# Patient Record
Sex: FEMALE | Race: WHITE | NOT HISPANIC OR LATINO | Employment: OTHER | URBAN - METROPOLITAN AREA
[De-identification: names, ages, dates, MRNs, and addresses within clinical notes are randomized per-mention and may not be internally consistent; named-entity substitution may affect disease eponyms.]

---

## 2019-12-26 ENCOUNTER — HOSPITAL ENCOUNTER (EMERGENCY)
Facility: HOSPITAL | Age: 84
Discharge: LEFT BEFORE DISCHARGE | End: 2019-12-27

## 2019-12-26 RX ORDER — SODIUM CHLORIDE 0.9 % (FLUSH) 0.9 %
10 SYRINGE (ML) INJECTION AS NEEDED
Status: DISCONTINUED | OUTPATIENT
Start: 2019-12-26 | End: 2019-12-27 | Stop reason: HOSPADM

## 2019-12-27 VITALS
RESPIRATION RATE: 17 BRPM | SYSTOLIC BLOOD PRESSURE: 156 MMHG | TEMPERATURE: 97.2 F | DIASTOLIC BLOOD PRESSURE: 86 MMHG | HEART RATE: 98 BPM | OXYGEN SATURATION: 95 %

## 2023-01-01 ENCOUNTER — HOSPITAL ENCOUNTER (INPATIENT)
Facility: HOSPITAL | Age: 88
LOS: 6 days | DRG: 871 | End: 2023-05-15
Attending: EMERGENCY MEDICINE | Admitting: HOSPITALIST
Payer: MEDICARE

## 2023-01-01 ENCOUNTER — APPOINTMENT (OUTPATIENT)
Dept: GENERAL RADIOLOGY | Facility: HOSPITAL | Age: 88
DRG: 871 | End: 2023-01-01
Payer: MEDICARE

## 2023-01-01 ENCOUNTER — APPOINTMENT (OUTPATIENT)
Dept: CARDIOLOGY | Facility: HOSPITAL | Age: 88
DRG: 871 | End: 2023-01-01
Payer: MEDICARE

## 2023-01-01 VITALS
OXYGEN SATURATION: 86 % | DIASTOLIC BLOOD PRESSURE: 40 MMHG | BODY MASS INDEX: 21.33 KG/M2 | TEMPERATURE: 97 F | HEIGHT: 65 IN | HEART RATE: 102 BPM | SYSTOLIC BLOOD PRESSURE: 63 MMHG | RESPIRATION RATE: 12 BRPM | WEIGHT: 128 LBS

## 2023-01-01 DIAGNOSIS — N17.9 ACUTE KIDNEY INJURY: ICD-10-CM

## 2023-01-01 DIAGNOSIS — D72.829 LEUKOCYTOSIS, UNSPECIFIED TYPE: ICD-10-CM

## 2023-01-01 DIAGNOSIS — L03.116 CELLULITIS OF LEFT LOWER EXTREMITY: Primary | ICD-10-CM

## 2023-01-01 LAB
ALBUMIN SERPL-MCNC: 2.7 G/DL (ref 3.5–5.2)
ALBUMIN SERPL-MCNC: 3 G/DL (ref 3.5–5.2)
ALBUMIN SERPL-MCNC: 3.3 G/DL (ref 3.5–5.2)
ALBUMIN/GLOB SERPL: 1.3 G/DL
ALP SERPL-CCNC: 68 U/L (ref 39–117)
ALT SERPL W P-5'-P-CCNC: 11 U/L (ref 1–33)
ANION GAP SERPL CALCULATED.3IONS-SCNC: 15.3 MMOL/L (ref 5–15)
ANION GAP SERPL CALCULATED.3IONS-SCNC: 16 MMOL/L (ref 5–15)
ANION GAP SERPL CALCULATED.3IONS-SCNC: 9.3 MMOL/L (ref 5–15)
ANION GAP SERPL CALCULATED.3IONS-SCNC: 9.9 MMOL/L (ref 5–15)
AST SERPL-CCNC: 26 U/L (ref 1–32)
BACTERIA SPEC AEROBE CULT: NO GROWTH
BACTERIA SPEC AEROBE CULT: NORMAL
BACTERIA SPEC AEROBE CULT: NORMAL
BACTERIA UR QL AUTO: ABNORMAL /HPF
BASOPHILS # BLD AUTO: 0.07 10*3/MM3 (ref 0–0.2)
BASOPHILS # BLD AUTO: 0.07 10*3/MM3 (ref 0–0.2)
BASOPHILS # BLD AUTO: 0.09 10*3/MM3 (ref 0–0.2)
BASOPHILS # BLD AUTO: 0.1 10*3/MM3 (ref 0–0.2)
BASOPHILS NFR BLD AUTO: 0.3 % (ref 0–1.5)
BASOPHILS NFR BLD AUTO: 0.3 % (ref 0–1.5)
BASOPHILS NFR BLD AUTO: 0.4 % (ref 0–1.5)
BASOPHILS NFR BLD AUTO: 0.4 % (ref 0–1.5)
BH CV LOWER ARTERIAL LEFT GREAT TOE SYS MAX: 84
BH CV LOWER ARTERIAL LEFT TBI RATIO: 0.66
BH CV LOWER ARTERIAL RIGHT GREAT TOE SYS MAX: 64
BH CV LOWER ARTERIAL RIGHT TBI RATIO: 0.5
BILIRUB SERPL-MCNC: 0.3 MG/DL (ref 0–1.2)
BILIRUB UR QL STRIP: NEGATIVE
BUN SERPL-MCNC: 26 MG/DL (ref 8–23)
BUN SERPL-MCNC: 33 MG/DL (ref 8–23)
BUN SERPL-MCNC: 54 MG/DL (ref 8–23)
BUN SERPL-MCNC: 78 MG/DL (ref 8–23)
BUN/CREAT SERPL: 27.1 (ref 7–25)
BUN/CREAT SERPL: 28.9 (ref 7–25)
BUN/CREAT SERPL: 33.3 (ref 7–25)
BUN/CREAT SERPL: 33.3 (ref 7–25)
CALCIUM SPEC-SCNC: 8.2 MG/DL (ref 8.2–9.6)
CALCIUM SPEC-SCNC: 8.2 MG/DL (ref 8.2–9.6)
CALCIUM SPEC-SCNC: 8.4 MG/DL (ref 8.2–9.6)
CALCIUM SPEC-SCNC: 8.5 MG/DL (ref 8.2–9.6)
CHLORIDE SERPL-SCNC: 103 MMOL/L (ref 98–107)
CHLORIDE SERPL-SCNC: 105 MMOL/L (ref 98–107)
CHLORIDE SERPL-SCNC: 112 MMOL/L (ref 98–107)
CHLORIDE SERPL-SCNC: 115 MMOL/L (ref 98–107)
CHOLEST SERPL-MCNC: 99 MG/DL (ref 0–200)
CK SERPL-CCNC: 58 U/L (ref 20–180)
CLARITY UR: CLEAR
CO2 SERPL-SCNC: 18 MMOL/L (ref 22–29)
CO2 SERPL-SCNC: 19.7 MMOL/L (ref 22–29)
CO2 SERPL-SCNC: 22.7 MMOL/L (ref 22–29)
CO2 SERPL-SCNC: 23.1 MMOL/L (ref 22–29)
COLOR UR: YELLOW
CREAT SERPL-MCNC: 0.96 MG/DL (ref 0.57–1)
CREAT SERPL-MCNC: 1.14 MG/DL (ref 0.57–1)
CREAT SERPL-MCNC: 1.62 MG/DL (ref 0.57–1)
CREAT SERPL-MCNC: 2.34 MG/DL (ref 0.57–1)
CREAT UR-MCNC: 85.3 MG/DL
D-LACTATE SERPL-SCNC: 2 MMOL/L (ref 0.5–2)
DEPRECATED RDW RBC AUTO: 45.2 FL (ref 37–54)
DEPRECATED RDW RBC AUTO: 45.6 FL (ref 37–54)
DEPRECATED RDW RBC AUTO: 48.1 FL (ref 37–54)
DEPRECATED RDW RBC AUTO: 48.8 FL (ref 37–54)
EGFRCR SERPLBLD CKD-EPI 2021: 18.1 ML/MIN/1.73
EGFRCR SERPLBLD CKD-EPI 2021: 28.1 ML/MIN/1.73
EGFRCR SERPLBLD CKD-EPI 2021: 42.8 ML/MIN/1.73
EGFRCR SERPLBLD CKD-EPI 2021: 52.6 ML/MIN/1.73
EOSINOPHIL # BLD AUTO: 0.05 10*3/MM3 (ref 0–0.4)
EOSINOPHIL # BLD AUTO: 0.05 10*3/MM3 (ref 0–0.4)
EOSINOPHIL # BLD AUTO: 0.09 10*3/MM3 (ref 0–0.4)
EOSINOPHIL # BLD AUTO: 0.18 10*3/MM3 (ref 0–0.4)
EOSINOPHIL NFR BLD AUTO: 0.2 % (ref 0.3–6.2)
EOSINOPHIL NFR BLD AUTO: 0.2 % (ref 0.3–6.2)
EOSINOPHIL NFR BLD AUTO: 0.3 % (ref 0.3–6.2)
EOSINOPHIL NFR BLD AUTO: 1.1 % (ref 0.3–6.2)
EOSINOPHIL SPEC QL MICRO: 0 % EOS/100 CELLS (ref 0–0)
ERYTHROCYTE [DISTWIDTH] IN BLOOD BY AUTOMATED COUNT: 13.4 % (ref 12.3–15.4)
ERYTHROCYTE [DISTWIDTH] IN BLOOD BY AUTOMATED COUNT: 13.5 % (ref 12.3–15.4)
ERYTHROCYTE [DISTWIDTH] IN BLOOD BY AUTOMATED COUNT: 13.5 % (ref 12.3–15.4)
ERYTHROCYTE [DISTWIDTH] IN BLOOD BY AUTOMATED COUNT: 13.9 % (ref 12.3–15.4)
GLOBULIN UR ELPH-MCNC: 2.6 GM/DL
GLUCOSE BLDC GLUCOMTR-MCNC: 106 MG/DL (ref 70–130)
GLUCOSE BLDC GLUCOMTR-MCNC: 120 MG/DL (ref 70–130)
GLUCOSE BLDC GLUCOMTR-MCNC: 131 MG/DL (ref 70–130)
GLUCOSE BLDC GLUCOMTR-MCNC: 166 MG/DL (ref 70–130)
GLUCOSE BLDC GLUCOMTR-MCNC: 73 MG/DL (ref 70–130)
GLUCOSE BLDC GLUCOMTR-MCNC: 86 MG/DL (ref 70–130)
GLUCOSE BLDC GLUCOMTR-MCNC: 90 MG/DL (ref 70–130)
GLUCOSE BLDC GLUCOMTR-MCNC: 94 MG/DL (ref 70–130)
GLUCOSE SERPL-MCNC: 112 MG/DL (ref 65–99)
GLUCOSE SERPL-MCNC: 142 MG/DL (ref 65–99)
GLUCOSE SERPL-MCNC: 83 MG/DL (ref 65–99)
GLUCOSE SERPL-MCNC: 90 MG/DL (ref 65–99)
GLUCOSE UR STRIP-MCNC: NEGATIVE MG/DL
HBA1C MFR BLD: 6.2 % (ref 4.8–5.6)
HCT VFR BLD AUTO: 42.9 % (ref 34–46.6)
HCT VFR BLD AUTO: 43.9 % (ref 34–46.6)
HCT VFR BLD AUTO: 44.9 % (ref 34–46.6)
HCT VFR BLD AUTO: 46.6 % (ref 34–46.6)
HDLC SERPL-MCNC: 46 MG/DL (ref 40–60)
HGB BLD-MCNC: 13.6 G/DL (ref 12–15.9)
HGB BLD-MCNC: 13.8 G/DL (ref 12–15.9)
HGB BLD-MCNC: 14.4 G/DL (ref 12–15.9)
HGB BLD-MCNC: 14.6 G/DL (ref 12–15.9)
HGB UR QL STRIP.AUTO: ABNORMAL
HOLD SPECIMEN: NORMAL
HOLD SPECIMEN: NORMAL
HYALINE CASTS UR QL AUTO: ABNORMAL /LPF
IMM GRANULOCYTES # BLD AUTO: 0.18 10*3/MM3 (ref 0–0.05)
IMM GRANULOCYTES # BLD AUTO: 0.22 10*3/MM3 (ref 0–0.05)
IMM GRANULOCYTES # BLD AUTO: 0.3 10*3/MM3 (ref 0–0.05)
IMM GRANULOCYTES # BLD AUTO: 0.47 10*3/MM3 (ref 0–0.05)
IMM GRANULOCYTES NFR BLD AUTO: 0.8 % (ref 0–0.5)
IMM GRANULOCYTES NFR BLD AUTO: 1.1 % (ref 0–0.5)
IMM GRANULOCYTES NFR BLD AUTO: 1.1 % (ref 0–0.5)
IMM GRANULOCYTES NFR BLD AUTO: 1.7 % (ref 0–0.5)
KETONES UR QL STRIP: ABNORMAL
LDLC SERPL CALC-MCNC: 32 MG/DL (ref 0–100)
LDLC/HDLC SERPL: 0.63 {RATIO}
LEUKOCYTE ESTERASE UR QL STRIP.AUTO: ABNORMAL
LYMPHOCYTES # BLD AUTO: 1.19 10*3/MM3 (ref 0.7–3.1)
LYMPHOCYTES # BLD AUTO: 1.48 10*3/MM3 (ref 0.7–3.1)
LYMPHOCYTES # BLD AUTO: 2.28 10*3/MM3 (ref 0.7–3.1)
LYMPHOCYTES # BLD AUTO: 2.5 10*3/MM3 (ref 0.7–3.1)
LYMPHOCYTES NFR BLD AUTO: 4.2 % (ref 19.6–45.3)
LYMPHOCYTES NFR BLD AUTO: 8.7 % (ref 19.6–45.3)
LYMPHOCYTES NFR BLD AUTO: 8.9 % (ref 19.6–45.3)
LYMPHOCYTES NFR BLD AUTO: 9.5 % (ref 19.6–45.3)
MAXIMAL PREDICTED HEART RATE: 119 BPM
MCH RBC QN AUTO: 29.6 PG (ref 26.6–33)
MCH RBC QN AUTO: 29.6 PG (ref 26.6–33)
MCH RBC QN AUTO: 29.9 PG (ref 26.6–33)
MCH RBC QN AUTO: 30.4 PG (ref 26.6–33)
MCHC RBC AUTO-ENTMCNC: 30.7 G/DL (ref 31.5–35.7)
MCHC RBC AUTO-ENTMCNC: 31.3 G/DL (ref 31.5–35.7)
MCHC RBC AUTO-ENTMCNC: 31.7 G/DL (ref 31.5–35.7)
MCHC RBC AUTO-ENTMCNC: 32.8 G/DL (ref 31.5–35.7)
MCV RBC AUTO: 92.6 FL (ref 79–97)
MCV RBC AUTO: 93.5 FL (ref 79–97)
MCV RBC AUTO: 95.3 FL (ref 79–97)
MCV RBC AUTO: 96.1 FL (ref 79–97)
MONOCYTES # BLD AUTO: 0.95 10*3/MM3 (ref 0.1–0.9)
MONOCYTES # BLD AUTO: 1.19 10*3/MM3 (ref 0.1–0.9)
MONOCYTES # BLD AUTO: 1.52 10*3/MM3 (ref 0.1–0.9)
MONOCYTES # BLD AUTO: 1.65 10*3/MM3 (ref 0.1–0.9)
MONOCYTES NFR BLD AUTO: 4.6 % (ref 5–12)
MONOCYTES NFR BLD AUTO: 5.4 % (ref 5–12)
MONOCYTES NFR BLD AUTO: 5.7 % (ref 5–12)
MONOCYTES NFR BLD AUTO: 6.3 % (ref 5–12)
MRSA DNA SPEC QL NAA+PROBE: NORMAL
NEUTROPHILS NFR BLD AUTO: 13.83 10*3/MM3 (ref 1.7–7)
NEUTROPHILS NFR BLD AUTO: 21.6 10*3/MM3 (ref 1.7–7)
NEUTROPHILS NFR BLD AUTO: 22.28 10*3/MM3 (ref 1.7–7)
NEUTROPHILS NFR BLD AUTO: 24.98 10*3/MM3 (ref 1.7–7)
NEUTROPHILS NFR BLD AUTO: 82.5 % (ref 42.7–76)
NEUTROPHILS NFR BLD AUTO: 82.8 % (ref 42.7–76)
NEUTROPHILS NFR BLD AUTO: 85.3 % (ref 42.7–76)
NEUTROPHILS NFR BLD AUTO: 88.2 % (ref 42.7–76)
NITRITE UR QL STRIP: NEGATIVE
NRBC BLD AUTO-RTO: 0 /100 WBC (ref 0–0.2)
NT-PROBNP SERPL-MCNC: 4270 PG/ML (ref 0–1800)
PH UR STRIP.AUTO: <=5 [PH] (ref 5–8)
PHOSPHATE SERPL-MCNC: 2.1 MG/DL (ref 2.5–4.5)
PHOSPHATE SERPL-MCNC: 3.4 MG/DL (ref 2.5–4.5)
PLATELET # BLD AUTO: 240 10*3/MM3 (ref 140–450)
PLATELET # BLD AUTO: 269 10*3/MM3 (ref 140–450)
PLATELET # BLD AUTO: 302 10*3/MM3 (ref 140–450)
PLATELET # BLD AUTO: 359 10*3/MM3 (ref 140–450)
PMV BLD AUTO: 10 FL (ref 6–12)
PMV BLD AUTO: 10 FL (ref 6–12)
PMV BLD AUTO: 10.1 FL (ref 6–12)
PMV BLD AUTO: 9.9 FL (ref 6–12)
POTASSIUM SERPL-SCNC: 3.3 MMOL/L (ref 3.5–5.2)
POTASSIUM SERPL-SCNC: 3.6 MMOL/L (ref 3.5–5.2)
POTASSIUM SERPL-SCNC: 3.8 MMOL/L (ref 3.5–5.2)
POTASSIUM SERPL-SCNC: 5.2 MMOL/L (ref 3.5–5.2)
PROCALCITONIN SERPL-MCNC: 0.11 NG/ML (ref 0–0.25)
PROT ?TM UR-MCNC: 14.4 MG/DL
PROT SERPL-MCNC: 5.9 G/DL (ref 6–8.5)
PROT UR QL STRIP: ABNORMAL
RBC # BLD AUTO: 4.59 10*6/MM3 (ref 3.77–5.28)
RBC # BLD AUTO: 4.67 10*6/MM3 (ref 3.77–5.28)
RBC # BLD AUTO: 4.74 10*6/MM3 (ref 3.77–5.28)
RBC # BLD AUTO: 4.89 10*6/MM3 (ref 3.77–5.28)
RBC # UR STRIP: ABNORMAL /HPF
REF LAB TEST METHOD: ABNORMAL
SODIUM SERPL-SCNC: 137 MMOL/L (ref 136–145)
SODIUM SERPL-SCNC: 138 MMOL/L (ref 136–145)
SODIUM SERPL-SCNC: 144 MMOL/L (ref 136–145)
SODIUM SERPL-SCNC: 150 MMOL/L (ref 136–145)
SODIUM UR-SCNC: 24 MMOL/L
SP GR UR STRIP: 1.02 (ref 1–1.03)
SQUAMOUS #/AREA URNS HPF: ABNORMAL /HPF
STRESS TARGET HR: 101 BPM
TRIGL SERPL-MCNC: 120 MG/DL (ref 0–150)
TSH SERPL DL<=0.05 MIU/L-ACNC: 0.71 UIU/ML (ref 0.27–4.2)
UPPER ARTERIAL RIGHT ARM BRACHIAL SYS MAX: 127 MMHG
URATE SERPL-MCNC: 5.7 MG/DL (ref 2.4–5.7)
UROBILINOGEN UR QL STRIP: ABNORMAL
VANCOMYCIN SERPL-MCNC: 8.4 MCG/ML (ref 5–40)
VANCOMYCIN SERPL-MCNC: 9.5 MCG/ML (ref 5–40)
VLDLC SERPL-MCNC: 21 MG/DL (ref 5–40)
WBC # UR STRIP: ABNORMAL /HPF
WBC NRBC COR # BLD: 16.69 10*3/MM3 (ref 3.4–10.8)
WBC NRBC COR # BLD: 26.12 10*3/MM3 (ref 3.4–10.8)
WBC NRBC COR # BLD: 26.21 10*3/MM3 (ref 3.4–10.8)
WBC NRBC COR # BLD: 28.3 10*3/MM3 (ref 3.4–10.8)
WHOLE BLOOD HOLD COAG: NORMAL
WHOLE BLOOD HOLD SPECIMEN: NORMAL

## 2023-01-01 PROCEDURE — 82948 REAGENT STRIP/BLOOD GLUCOSE: CPT

## 2023-01-01 PROCEDURE — 93922 UPR/L XTREMITY ART 2 LEVELS: CPT

## 2023-01-01 PROCEDURE — 87086 URINE CULTURE/COLONY COUNT: CPT | Performed by: EMERGENCY MEDICINE

## 2023-01-01 PROCEDURE — 25010000002 ONDANSETRON PER 1 MG: Performed by: EMERGENCY MEDICINE

## 2023-01-01 PROCEDURE — 81001 URINALYSIS AUTO W/SCOPE: CPT | Performed by: EMERGENCY MEDICINE

## 2023-01-01 PROCEDURE — 36415 COLL VENOUS BLD VENIPUNCTURE: CPT | Performed by: EMERGENCY MEDICINE

## 2023-01-01 PROCEDURE — 84300 ASSAY OF URINE SODIUM: CPT | Performed by: INTERNAL MEDICINE

## 2023-01-01 PROCEDURE — 51798 US URINE CAPACITY MEASURE: CPT

## 2023-01-01 PROCEDURE — 25010000002 CEFEPIME PER 500 MG: Performed by: INTERNAL MEDICINE

## 2023-01-01 PROCEDURE — 25010000002 PIPERACILLIN SOD-TAZOBACTAM PER 1 G: Performed by: HOSPITALIST

## 2023-01-01 PROCEDURE — 83880 ASSAY OF NATRIURETIC PEPTIDE: CPT | Performed by: HOSPITALIST

## 2023-01-01 PROCEDURE — 63710000001 INSULIN LISPRO (HUMAN) PER 5 UNITS: Performed by: HOSPITALIST

## 2023-01-01 PROCEDURE — 25010000002 VANCOMYCIN PER 500 MG: Performed by: EMERGENCY MEDICINE

## 2023-01-01 PROCEDURE — 85025 COMPLETE CBC W/AUTO DIFF WBC: CPT | Performed by: HOSPITALIST

## 2023-01-01 PROCEDURE — 87641 MR-STAPH DNA AMP PROBE: CPT | Performed by: INTERNAL MEDICINE

## 2023-01-01 PROCEDURE — 25010000002 MORPHINE PER 10 MG: Performed by: EMERGENCY MEDICINE

## 2023-01-01 PROCEDURE — 97166 OT EVAL MOD COMPLEX 45 MIN: CPT

## 2023-01-01 PROCEDURE — 25010000002 VANCOMYCIN 750 MG RECONSTITUTED SOLUTION 1 EACH VIAL: Performed by: INTERNAL MEDICINE

## 2023-01-01 PROCEDURE — 25010000002 MORPHINE PER 10 MG: Performed by: HOSPITALIST

## 2023-01-01 PROCEDURE — 80069 RENAL FUNCTION PANEL: CPT | Performed by: INTERNAL MEDICINE

## 2023-01-01 PROCEDURE — 25010000002 LORAZEPAM PER 2 MG: Performed by: HOSPITALIST

## 2023-01-01 PROCEDURE — 87040 BLOOD CULTURE FOR BACTERIA: CPT | Performed by: EMERGENCY MEDICINE

## 2023-01-01 PROCEDURE — 80048 BASIC METABOLIC PNL TOTAL CA: CPT | Performed by: HOSPITALIST

## 2023-01-01 PROCEDURE — 82570 ASSAY OF URINE CREATININE: CPT | Performed by: INTERNAL MEDICINE

## 2023-01-01 PROCEDURE — 85025 COMPLETE CBC W/AUTO DIFF WBC: CPT | Performed by: EMERGENCY MEDICINE

## 2023-01-01 PROCEDURE — 87205 SMEAR GRAM STAIN: CPT | Performed by: INTERNAL MEDICINE

## 2023-01-01 PROCEDURE — 83036 HEMOGLOBIN GLYCOSYLATED A1C: CPT | Performed by: HOSPITALIST

## 2023-01-01 PROCEDURE — 80202 ASSAY OF VANCOMYCIN: CPT | Performed by: HOSPITALIST

## 2023-01-01 PROCEDURE — 80053 COMPREHEN METABOLIC PANEL: CPT | Performed by: EMERGENCY MEDICINE

## 2023-01-01 PROCEDURE — 92610 EVALUATE SWALLOWING FUNCTION: CPT

## 2023-01-01 PROCEDURE — 84443 ASSAY THYROID STIM HORMONE: CPT | Performed by: HOSPITALIST

## 2023-01-01 PROCEDURE — 84550 ASSAY OF BLOOD/URIC ACID: CPT | Performed by: INTERNAL MEDICINE

## 2023-01-01 PROCEDURE — 83605 ASSAY OF LACTIC ACID: CPT | Performed by: EMERGENCY MEDICINE

## 2023-01-01 PROCEDURE — 97162 PT EVAL MOD COMPLEX 30 MIN: CPT

## 2023-01-01 PROCEDURE — 0 CEFEPIME PER 500 MG: Performed by: INTERNAL MEDICINE

## 2023-01-01 PROCEDURE — 25010000002 HYDROMORPHONE 1 MG/ML SOLUTION: Performed by: HOSPITALIST

## 2023-01-01 PROCEDURE — 73610 X-RAY EXAM OF ANKLE: CPT

## 2023-01-01 PROCEDURE — 25010000002 VANCOMYCIN PER 500 MG: Performed by: HOSPITALIST

## 2023-01-01 PROCEDURE — 80061 LIPID PANEL: CPT | Performed by: HOSPITALIST

## 2023-01-01 PROCEDURE — 25010000002 PIPERACILLIN SOD-TAZOBACTAM PER 1 G: Performed by: EMERGENCY MEDICINE

## 2023-01-01 PROCEDURE — 25010000002 HYDROMORPHONE PER 4 MG: Performed by: HOSPITALIST

## 2023-01-01 PROCEDURE — 84145 PROCALCITONIN (PCT): CPT | Performed by: EMERGENCY MEDICINE

## 2023-01-01 PROCEDURE — 84156 ASSAY OF PROTEIN URINE: CPT | Performed by: INTERNAL MEDICINE

## 2023-01-01 PROCEDURE — 82550 ASSAY OF CK (CPK): CPT | Performed by: INTERNAL MEDICINE

## 2023-01-01 PROCEDURE — 97110 THERAPEUTIC EXERCISES: CPT

## 2023-01-01 PROCEDURE — 99285 EMERGENCY DEPT VISIT HI MDM: CPT

## 2023-01-01 PROCEDURE — P9612 CATHETERIZE FOR URINE SPEC: HCPCS

## 2023-01-01 RX ORDER — MORPHINE SULFATE 2 MG/ML
2 INJECTION, SOLUTION INTRAMUSCULAR; INTRAVENOUS ONCE
Status: COMPLETED | OUTPATIENT
Start: 2023-01-01 | End: 2023-01-01

## 2023-01-01 RX ORDER — ACETAMINOPHEN 325 MG/1
650 TABLET ORAL EVERY 4 HOURS PRN
Status: DISCONTINUED | OUTPATIENT
Start: 2023-01-01 | End: 2023-01-01 | Stop reason: HOSPADM

## 2023-01-01 RX ORDER — GLYCOPYRROLATE 0.2 MG/ML
0.4 INJECTION INTRAMUSCULAR; INTRAVENOUS
Status: DISCONTINUED | OUTPATIENT
Start: 2023-01-01 | End: 2023-01-01 | Stop reason: HOSPADM

## 2023-01-01 RX ORDER — LORAZEPAM 2 MG/ML
2 CONCENTRATE ORAL
Status: DISCONTINUED | OUTPATIENT
Start: 2023-01-01 | End: 2023-01-01 | Stop reason: HOSPADM

## 2023-01-01 RX ORDER — ACETAMINOPHEN 325 MG/1
650 TABLET ORAL EVERY 4 HOURS PRN
Status: DISCONTINUED | OUTPATIENT
Start: 2023-01-01 | End: 2023-01-01

## 2023-01-01 RX ORDER — VANCOMYCIN HYDROCHLORIDE 1 G/200ML
20 INJECTION, SOLUTION INTRAVENOUS ONCE
Status: COMPLETED | OUTPATIENT
Start: 2023-01-01 | End: 2023-01-01

## 2023-01-01 RX ORDER — ATORVASTATIN CALCIUM 10 MG/1
10 TABLET, FILM COATED ORAL DAILY
Status: DISCONTINUED | OUTPATIENT
Start: 2023-01-01 | End: 2023-01-01

## 2023-01-01 RX ORDER — HALOPERIDOL 2 MG/ML
1 SOLUTION ORAL EVERY 4 HOURS PRN
Status: DISCONTINUED | OUTPATIENT
Start: 2023-01-01 | End: 2023-01-01 | Stop reason: HOSPADM

## 2023-01-01 RX ORDER — MORPHINE SULFATE 20 MG/ML
10 SOLUTION ORAL
Status: DISCONTINUED | OUTPATIENT
Start: 2023-01-01 | End: 2023-01-01 | Stop reason: HOSPADM

## 2023-01-01 RX ORDER — CASTOR OIL AND BALSAM, PERU 788; 87 MG/G; MG/G
1 OINTMENT TOPICAL EVERY 12 HOURS SCHEDULED
Status: DISCONTINUED | OUTPATIENT
Start: 2023-01-01 | End: 2023-01-01

## 2023-01-01 RX ORDER — NICOTINE POLACRILEX 4 MG
15 LOZENGE BUCCAL
Status: DISCONTINUED | OUTPATIENT
Start: 2023-01-01 | End: 2023-01-01

## 2023-01-01 RX ORDER — ACETAMINOPHEN 650 MG/1
650 SUPPOSITORY RECTAL EVERY 4 HOURS PRN
Status: DISCONTINUED | OUTPATIENT
Start: 2023-01-01 | End: 2023-01-01 | Stop reason: HOSPADM

## 2023-01-01 RX ORDER — HALOPERIDOL 5 MG/ML
1 INJECTION INTRAMUSCULAR EVERY 4 HOURS PRN
Status: DISCONTINUED | OUTPATIENT
Start: 2023-01-01 | End: 2023-01-01 | Stop reason: HOSPADM

## 2023-01-01 RX ORDER — PROMETHAZINE HYDROCHLORIDE 6.25 MG/5ML
6.25 SYRUP ORAL EVERY 4 HOURS PRN
Status: DISCONTINUED | OUTPATIENT
Start: 2023-01-01 | End: 2023-01-01 | Stop reason: HOSPADM

## 2023-01-01 RX ORDER — FAMOTIDINE 10 MG/ML
20 INJECTION, SOLUTION INTRAVENOUS EVERY 12 HOURS SCHEDULED
Status: DISCONTINUED | OUTPATIENT
Start: 2023-01-01 | End: 2023-01-01

## 2023-01-01 RX ORDER — IBUPROFEN 600 MG/1
1 TABLET ORAL
Status: DISCONTINUED | OUTPATIENT
Start: 2023-01-01 | End: 2023-01-01

## 2023-01-01 RX ORDER — AMLODIPINE BESYLATE 5 MG/1
5 TABLET ORAL DAILY
Status: DISCONTINUED | OUTPATIENT
Start: 2023-01-01 | End: 2023-01-01

## 2023-01-01 RX ORDER — DIPHENOXYLATE HYDROCHLORIDE AND ATROPINE SULFATE 2.5; .025 MG/1; MG/1
1 TABLET ORAL
Status: DISCONTINUED | OUTPATIENT
Start: 2023-01-01 | End: 2023-01-01 | Stop reason: HOSPADM

## 2023-01-01 RX ORDER — PROMETHAZINE HYDROCHLORIDE 25 MG/1
6.25 TABLET ORAL EVERY 4 HOURS PRN
Status: DISCONTINUED | OUTPATIENT
Start: 2023-01-01 | End: 2023-01-01 | Stop reason: HOSPADM

## 2023-01-01 RX ORDER — OLANZAPINE 10 MG/1
5 INJECTION, POWDER, LYOPHILIZED, FOR SOLUTION INTRAMUSCULAR ONCE
Status: DISCONTINUED | OUTPATIENT
Start: 2023-01-01 | End: 2023-01-01

## 2023-01-01 RX ORDER — SODIUM CHLORIDE 9 MG/ML
75 INJECTION, SOLUTION INTRAVENOUS CONTINUOUS
Status: DISCONTINUED | OUTPATIENT
Start: 2023-01-01 | End: 2023-01-01

## 2023-01-01 RX ORDER — LORAZEPAM 2 MG/ML
1 CONCENTRATE ORAL
Status: DISCONTINUED | OUTPATIENT
Start: 2023-01-01 | End: 2023-01-01 | Stop reason: HOSPADM

## 2023-01-01 RX ORDER — VANCOMYCIN HYDROCHLORIDE 1 G/200ML
20 INJECTION, SOLUTION INTRAVENOUS ONCE
Status: DISCONTINUED | OUTPATIENT
Start: 2023-01-01 | End: 2023-01-01

## 2023-01-01 RX ORDER — GLYCOPYRROLATE 0.2 MG/ML
0.2 INJECTION INTRAMUSCULAR; INTRAVENOUS
Status: DISCONTINUED | OUTPATIENT
Start: 2023-01-01 | End: 2023-01-01 | Stop reason: HOSPADM

## 2023-01-01 RX ORDER — HYDROMORPHONE HYDROCHLORIDE 1 MG/ML
0.5 INJECTION, SOLUTION INTRAMUSCULAR; INTRAVENOUS; SUBCUTANEOUS
Status: DISCONTINUED | OUTPATIENT
Start: 2023-01-01 | End: 2023-01-01 | Stop reason: HOSPADM

## 2023-01-01 RX ORDER — FAMOTIDINE 20 MG/1
20 TABLET, FILM COATED ORAL
Status: DISCONTINUED | OUTPATIENT
Start: 2023-01-01 | End: 2023-01-01

## 2023-01-01 RX ORDER — LORAZEPAM 2 MG/ML
2 INJECTION INTRAMUSCULAR
Status: DISCONTINUED | OUTPATIENT
Start: 2023-01-01 | End: 2023-01-01 | Stop reason: HOSPADM

## 2023-01-01 RX ORDER — LORAZEPAM 2 MG/ML
0.5 INJECTION INTRAMUSCULAR
Status: DISCONTINUED | OUTPATIENT
Start: 2023-01-01 | End: 2023-01-01 | Stop reason: HOSPADM

## 2023-01-01 RX ORDER — INSULIN LISPRO 100 [IU]/ML
2-7 INJECTION, SOLUTION INTRAVENOUS; SUBCUTANEOUS
Status: DISCONTINUED | OUTPATIENT
Start: 2023-01-01 | End: 2023-01-01

## 2023-01-01 RX ORDER — ONDANSETRON 2 MG/ML
4 INJECTION INTRAMUSCULAR; INTRAVENOUS ONCE
Status: COMPLETED | OUTPATIENT
Start: 2023-01-01 | End: 2023-01-01

## 2023-01-01 RX ORDER — DIPHENHYDRAMINE HYDROCHLORIDE AND LIDOCAINE HYDROCHLORIDE AND ALUMINUM HYDROXIDE AND MAGNESIUM HYDRO
5 KIT EVERY 4 HOURS PRN
Status: DISCONTINUED | OUTPATIENT
Start: 2023-01-01 | End: 2023-01-01 | Stop reason: HOSPADM

## 2023-01-01 RX ORDER — MORPHINE SULFATE 2 MG/ML
2 INJECTION, SOLUTION INTRAMUSCULAR; INTRAVENOUS
Status: DISCONTINUED | OUTPATIENT
Start: 2023-01-01 | End: 2023-01-01 | Stop reason: HOSPADM

## 2023-01-01 RX ORDER — MORPHINE SULFATE 20 MG/ML
5 SOLUTION ORAL
Status: DISCONTINUED | OUTPATIENT
Start: 2023-01-01 | End: 2023-01-01 | Stop reason: HOSPADM

## 2023-01-01 RX ORDER — ACETAMINOPHEN 160 MG/5ML
650 SOLUTION ORAL EVERY 4 HOURS PRN
Status: DISCONTINUED | OUTPATIENT
Start: 2023-01-01 | End: 2023-01-01 | Stop reason: HOSPADM

## 2023-01-01 RX ORDER — FAMOTIDINE 20 MG/1
20 TABLET, FILM COATED ORAL DAILY
Status: DISCONTINUED | OUTPATIENT
Start: 2023-01-01 | End: 2023-01-01

## 2023-01-01 RX ORDER — HALOPERIDOL 1 MG/1
1 TABLET ORAL EVERY 4 HOURS PRN
Status: DISCONTINUED | OUTPATIENT
Start: 2023-01-01 | End: 2023-01-01 | Stop reason: HOSPADM

## 2023-01-01 RX ORDER — LORAZEPAM 2 MG/ML
1 INJECTION INTRAMUSCULAR
Status: DISCONTINUED | OUTPATIENT
Start: 2023-01-01 | End: 2023-01-01 | Stop reason: HOSPADM

## 2023-01-01 RX ORDER — ONDANSETRON 2 MG/ML
4 INJECTION INTRAMUSCULAR; INTRAVENOUS EVERY 6 HOURS PRN
Status: DISCONTINUED | OUTPATIENT
Start: 2023-01-01 | End: 2023-01-01

## 2023-01-01 RX ORDER — OLANZAPINE 10 MG/1
5 INJECTION, POWDER, LYOPHILIZED, FOR SOLUTION INTRAMUSCULAR ONCE
Status: COMPLETED | OUTPATIENT
Start: 2023-01-01 | End: 2023-01-01

## 2023-01-01 RX ORDER — PROMETHAZINE HYDROCHLORIDE 12.5 MG/1
6.25 SUPPOSITORY RECTAL EVERY 4 HOURS PRN
Status: DISCONTINUED | OUTPATIENT
Start: 2023-01-01 | End: 2023-01-01 | Stop reason: HOSPADM

## 2023-01-01 RX ORDER — ATORVASTATIN CALCIUM 20 MG/1
10 TABLET, FILM COATED ORAL NIGHTLY
Status: DISCONTINUED | OUTPATIENT
Start: 2023-01-01 | End: 2023-01-01

## 2023-01-01 RX ORDER — MORPHINE SULFATE 2 MG/ML
4 INJECTION, SOLUTION INTRAMUSCULAR; INTRAVENOUS
Status: DISCONTINUED | OUTPATIENT
Start: 2023-01-01 | End: 2023-01-01 | Stop reason: HOSPADM

## 2023-01-01 RX ORDER — DEXTROSE MONOHYDRATE 25 G/50ML
25 INJECTION, SOLUTION INTRAVENOUS
Status: DISCONTINUED | OUTPATIENT
Start: 2023-01-01 | End: 2023-01-01

## 2023-01-01 RX ORDER — KETOROLAC TROMETHAMINE 15 MG/ML
15 INJECTION, SOLUTION INTRAMUSCULAR; INTRAVENOUS EVERY 6 HOURS PRN
Status: DISCONTINUED | OUTPATIENT
Start: 2023-01-01 | End: 2023-01-01 | Stop reason: HOSPADM

## 2023-01-01 RX ORDER — ASPIRIN 81 MG/1
81 TABLET, CHEWABLE ORAL DAILY
Status: DISCONTINUED | OUTPATIENT
Start: 2023-01-01 | End: 2023-01-01

## 2023-01-01 RX ORDER — FAMOTIDINE 20 MG/1
20 TABLET, FILM COATED ORAL 2 TIMES DAILY
Status: DISCONTINUED | OUTPATIENT
Start: 2023-01-01 | End: 2023-01-01

## 2023-01-01 RX ORDER — GABAPENTIN 100 MG/1
100 CAPSULE ORAL NIGHTLY
Status: DISCONTINUED | OUTPATIENT
Start: 2023-01-01 | End: 2023-01-01

## 2023-01-01 RX ORDER — LORAZEPAM 2 MG/ML
0.5 CONCENTRATE ORAL
Status: DISCONTINUED | OUTPATIENT
Start: 2023-01-01 | End: 2023-01-01 | Stop reason: HOSPADM

## 2023-01-01 RX ORDER — DEXTROSE MONOHYDRATE 50 MG/ML
100 INJECTION, SOLUTION INTRAVENOUS CONTINUOUS
Status: DISCONTINUED | OUTPATIENT
Start: 2023-01-01 | End: 2023-01-01

## 2023-01-01 RX ORDER — MORPHINE SULFATE 2 MG/ML
2 INJECTION, SOLUTION INTRAMUSCULAR; INTRAVENOUS EVERY 4 HOURS PRN
Status: DISCONTINUED | OUTPATIENT
Start: 2023-01-01 | End: 2023-01-01

## 2023-01-01 RX ADMIN — HYDROMORPHONE HYDROCHLORIDE 1 MG: 1 INJECTION, SOLUTION INTRAMUSCULAR; INTRAVENOUS; SUBCUTANEOUS at 08:20

## 2023-01-01 RX ADMIN — Medication 1 APPLICATION: at 09:05

## 2023-01-01 RX ADMIN — HYDROMORPHONE HYDROCHLORIDE 1 MG: 1 INJECTION, SOLUTION INTRAMUSCULAR; INTRAVENOUS; SUBCUTANEOUS at 20:44

## 2023-01-01 RX ADMIN — ONDANSETRON 4 MG: 2 INJECTION INTRAMUSCULAR; INTRAVENOUS at 19:01

## 2023-01-01 RX ADMIN — HYDROMORPHONE HYDROCHLORIDE 1 MG: 1 INJECTION, SOLUTION INTRAMUSCULAR; INTRAVENOUS; SUBCUTANEOUS at 12:13

## 2023-01-01 RX ADMIN — FAMOTIDINE 20 MG: 20 TABLET, FILM COATED ORAL at 09:04

## 2023-01-01 RX ADMIN — TAZOBACTAM SODIUM AND PIPERACILLIN SODIUM 3.38 G: 375; 3 INJECTION, SOLUTION INTRAVENOUS at 04:02

## 2023-01-01 RX ADMIN — ASPIRIN 81 MG: 81 TABLET, CHEWABLE ORAL at 09:04

## 2023-01-01 RX ADMIN — CASTOR OIL AND BALSAM, PERU 1 APPLICATION: 788; 87 OINTMENT TOPICAL at 09:04

## 2023-01-01 RX ADMIN — ACETAMINOPHEN 650 MG: 325 TABLET ORAL at 17:06

## 2023-01-01 RX ADMIN — FAMOTIDINE 20 MG: 10 INJECTION INTRAVENOUS at 18:18

## 2023-01-01 RX ADMIN — FAMOTIDINE 20 MG: 10 INJECTION INTRAVENOUS at 20:16

## 2023-01-01 RX ADMIN — POTASSIUM PHOSPHATE, MONOBASIC POTASSIUM PHOSPHATE, DIBASIC 30 MMOL: 224; 236 INJECTION, SOLUTION, CONCENTRATE INTRAVENOUS at 17:46

## 2023-01-01 RX ADMIN — ACETAMINOPHEN 650 MG: 325 TABLET ORAL at 08:12

## 2023-01-01 RX ADMIN — MORPHINE SULFATE 2 MG: 2 INJECTION, SOLUTION INTRAMUSCULAR; INTRAVENOUS at 14:38

## 2023-01-01 RX ADMIN — LORAZEPAM 2 MG: 2 INJECTION INTRAMUSCULAR; INTRAVENOUS at 08:20

## 2023-01-01 RX ADMIN — ATORVASTATIN CALCIUM 10 MG: 20 TABLET, FILM COATED ORAL at 21:10

## 2023-01-01 RX ADMIN — Medication 1 APPLICATION: at 21:06

## 2023-01-01 RX ADMIN — TAZOBACTAM SODIUM AND PIPERACILLIN SODIUM 3.38 G: 375; 3 INJECTION, SOLUTION INTRAVENOUS at 17:23

## 2023-01-01 RX ADMIN — SODIUM CHLORIDE 75 ML/HR: 9 INJECTION, SOLUTION INTRAVENOUS at 05:28

## 2023-01-01 RX ADMIN — FAMOTIDINE 20 MG: 20 TABLET, FILM COATED ORAL at 23:46

## 2023-01-01 RX ADMIN — FAMOTIDINE 20 MG: 20 TABLET, FILM COATED ORAL at 17:06

## 2023-01-01 RX ADMIN — OLANZAPINE 5 MG: 10 INJECTION, POWDER, FOR SOLUTION INTRAMUSCULAR at 19:37

## 2023-01-01 RX ADMIN — LORAZEPAM 2 MG: 2 INJECTION INTRAMUSCULAR; INTRAVENOUS at 20:44

## 2023-01-01 RX ADMIN — DEXTROSE MONOHYDRATE 100 ML/HR: 50 INJECTION, SOLUTION INTRAVENOUS at 10:43

## 2023-01-01 RX ADMIN — TAZOBACTAM SODIUM AND PIPERACILLIN SODIUM 3.38 G: 375; 3 INJECTION, SOLUTION INTRAVENOUS at 23:46

## 2023-01-01 RX ADMIN — GABAPENTIN 100 MG: 100 CAPSULE ORAL at 20:16

## 2023-01-01 RX ADMIN — LORAZEPAM 0.5 MG: 2 INJECTION INTRAMUSCULAR; INTRAVENOUS at 16:25

## 2023-01-01 RX ADMIN — ATORVASTATIN CALCIUM 10 MG: 20 TABLET, FILM COATED ORAL at 20:16

## 2023-01-01 RX ADMIN — SODIUM CHLORIDE 500 ML: 9 INJECTION, SOLUTION INTRAVENOUS at 19:04

## 2023-01-01 RX ADMIN — HYDROMORPHONE HYDROCHLORIDE 0.5 MG: 1 INJECTION, SOLUTION INTRAMUSCULAR; INTRAVENOUS; SUBCUTANEOUS at 01:05

## 2023-01-01 RX ADMIN — CASTOR OIL AND BALSAM, PERU 1 APPLICATION: 788; 87 OINTMENT TOPICAL at 21:06

## 2023-01-01 RX ADMIN — MORPHINE SULFATE 2 MG: 2 INJECTION, SOLUTION INTRAMUSCULAR; INTRAVENOUS at 19:02

## 2023-01-01 RX ADMIN — VANCOMYCIN HYDROCHLORIDE 500 MG: 500 INJECTION, POWDER, LYOPHILIZED, FOR SOLUTION INTRAVENOUS at 18:18

## 2023-01-01 RX ADMIN — HYDROMORPHONE HYDROCHLORIDE 1 MG: 1 INJECTION, SOLUTION INTRAMUSCULAR; INTRAVENOUS; SUBCUTANEOUS at 05:23

## 2023-01-01 RX ADMIN — HYDROMORPHONE HYDROCHLORIDE 1 MG: 1 INJECTION, SOLUTION INTRAMUSCULAR; INTRAVENOUS; SUBCUTANEOUS at 16:50

## 2023-01-01 RX ADMIN — VANCOMYCIN HYDROCHLORIDE 750 MG: 750 INJECTION, POWDER, LYOPHILIZED, FOR SOLUTION INTRAVENOUS at 00:07

## 2023-01-01 RX ADMIN — LORAZEPAM 0.5 MG: 2 INJECTION INTRAMUSCULAR; INTRAVENOUS at 21:59

## 2023-01-01 RX ADMIN — CASTOR OIL AND BALSAM, PERU 1 APPLICATION: 788; 87 OINTMENT TOPICAL at 08:53

## 2023-01-01 RX ADMIN — ASPIRIN 81 MG: 81 TABLET, CHEWABLE ORAL at 20:16

## 2023-01-01 RX ADMIN — TAZOBACTAM SODIUM AND PIPERACILLIN SODIUM 3.38 G: 375; 3 INJECTION, SOLUTION INTRAVENOUS at 20:17

## 2023-01-01 RX ADMIN — LORAZEPAM 2 MG: 2 INJECTION INTRAMUSCULAR; INTRAVENOUS at 16:50

## 2023-01-01 RX ADMIN — SODIUM CHLORIDE 75 ML/HR: 9 INJECTION, SOLUTION INTRAVENOUS at 18:18

## 2023-01-01 RX ADMIN — MORPHINE SULFATE 4 MG: 2 INJECTION, SOLUTION INTRAMUSCULAR; INTRAVENOUS at 08:59

## 2023-01-01 RX ADMIN — LORAZEPAM 0.5 MG: 2 INJECTION INTRAMUSCULAR; INTRAVENOUS at 12:18

## 2023-01-01 RX ADMIN — LORAZEPAM 0.5 MG: 2 INJECTION INTRAMUSCULAR; INTRAVENOUS at 01:05

## 2023-01-01 RX ADMIN — Medication 1 APPLICATION: at 21:00

## 2023-01-01 RX ADMIN — LORAZEPAM 2 MG: 2 INJECTION INTRAMUSCULAR; INTRAVENOUS at 12:13

## 2023-01-01 RX ADMIN — SILVER SULFADIAZINE 1 APPLICATION: 10 CREAM TOPICAL at 18:12

## 2023-01-01 RX ADMIN — LORAZEPAM 0.5 MG: 2 INJECTION INTRAMUSCULAR; INTRAVENOUS at 08:59

## 2023-01-01 RX ADMIN — ACETAMINOPHEN 650 MG: 325 TABLET ORAL at 12:06

## 2023-01-01 RX ADMIN — SODIUM CHLORIDE 125 ML/HR: 9 INJECTION, SOLUTION INTRAVENOUS at 19:05

## 2023-01-01 RX ADMIN — MORPHINE SULFATE 4 MG: 2 INJECTION, SOLUTION INTRAMUSCULAR; INTRAVENOUS at 12:18

## 2023-01-01 RX ADMIN — INSULIN LISPRO 2 UNITS: 100 INJECTION, SOLUTION INTRAVENOUS; SUBCUTANEOUS at 21:10

## 2023-01-01 RX ADMIN — HYDROMORPHONE HYDROCHLORIDE 1 MG: 1 INJECTION, SOLUTION INTRAMUSCULAR; INTRAVENOUS; SUBCUTANEOUS at 01:05

## 2023-01-01 RX ADMIN — LORAZEPAM 0.5 MG: 2 INJECTION INTRAMUSCULAR; INTRAVENOUS at 14:36

## 2023-01-01 RX ADMIN — SODIUM CHLORIDE 75 ML/HR: 9 INJECTION, SOLUTION INTRAVENOUS at 10:51

## 2023-01-01 RX ADMIN — LORAZEPAM 1 MG: 2 INJECTION INTRAMUSCULAR; INTRAVENOUS at 05:23

## 2023-01-01 RX ADMIN — HYDROMORPHONE HYDROCHLORIDE 1 MG: 1 INJECTION, SOLUTION INTRAMUSCULAR; INTRAVENOUS; SUBCUTANEOUS at 16:12

## 2023-01-01 RX ADMIN — VANCOMYCIN HYDROCHLORIDE 1000 MG: 1 INJECTION, SOLUTION INTRAVENOUS at 17:52

## 2023-01-01 RX ADMIN — LORAZEPAM 1 MG: 2 INJECTION INTRAMUSCULAR; INTRAVENOUS at 16:12

## 2023-01-01 RX ADMIN — MORPHINE SULFATE 2 MG: 2 INJECTION, SOLUTION INTRAMUSCULAR; INTRAVENOUS at 21:59

## 2023-01-01 RX ADMIN — CEFEPIME 2 G: 2 INJECTION, POWDER, FOR SOLUTION INTRAVENOUS at 09:05

## 2023-01-01 RX ADMIN — Medication 1 APPLICATION: at 08:54

## 2023-01-01 RX ADMIN — MORPHINE SULFATE 2 MG: 2 INJECTION, SOLUTION INTRAMUSCULAR; INTRAVENOUS at 05:27

## 2023-01-01 RX ADMIN — CEFEPIME 1 G: 1 INJECTION, POWDER, FOR SOLUTION INTRAMUSCULAR; INTRAVENOUS at 08:23

## 2023-01-01 RX ADMIN — MORPHINE SULFATE 2 MG: 2 INJECTION, SOLUTION INTRAMUSCULAR; INTRAVENOUS at 16:25

## 2023-01-01 RX ADMIN — ASPIRIN 81 MG: 81 TABLET, CHEWABLE ORAL at 08:12

## 2023-01-01 RX ADMIN — MORPHINE SULFATE 2 MG: 2 INJECTION, SOLUTION INTRAMUSCULAR; INTRAVENOUS at 22:41

## 2023-01-01 RX ADMIN — LORAZEPAM 1 MG: 2 INJECTION INTRAMUSCULAR; INTRAVENOUS at 01:05

## 2023-05-09 PROBLEM — L03.116 CELLULITIS OF LEFT LOWER EXTREMITY: Status: ACTIVE | Noted: 2023-01-01

## 2023-05-09 NOTE — ED TRIAGE NOTES
"Pt comes to the ER from home for worsening cellulitis in left foot. Pt seen by home health today and was told to come to ER d/t antibiotics \"not working\".   "

## 2023-05-09 NOTE — ED PROVIDER NOTES
EMERGENCY DEPARTMENT ENCOUNTER    Room Number:  129/1  Date seen:  5/9/2023  PCP: Lucy Walker MD  Historian: Patient      HPI:  Chief Complaint: Cellulitis that is failed outpatient antibiotics    Context: Dayanna Ayala is a 89 y.o. female who presents to the ED c/o worsening left foot cellulitis despite outpatient antibiotics and home health dressing changes.  The patient has history of diabetes.  The patient still lives alone at home.  The daughter is here and states the patient has had these wounds on her feet on and off in the past and almost always can be treated with dressing changes alone however this time it has been worse and have tried antibiotics for the past week with the patient continues to get worse and thus was sent to the emergency room for further evaluation and care.  There is been no known fever, chills or leg pain      PAST MEDICAL HISTORY  Active Ambulatory Problems     Diagnosis Date Noted   • No Active Ambulatory Problems     Resolved Ambulatory Problems     Diagnosis Date Noted   • No Resolved Ambulatory Problems     Past Medical History:   Diagnosis Date   • Diabetes mellitus    • Hyperlipidemia    • Hypertension          REVIEW OF SYSTEMS  All systems reviewed and negative except for those discussed in HPI.       PAST SURGICAL HISTORY  Past Surgical History:   Procedure Laterality Date   • HEMORRHOIDECTOMY     • THROAT SURGERY           FAMILY HISTORY  History reviewed. No pertinent family history.      SOCIAL HISTORY  Social History     Socioeconomic History   • Marital status:    Tobacco Use   • Smoking status: Never   Vaping Use   • Vaping Use: Never used   Substance and Sexual Activity   • Alcohol use: Never   • Drug use: Never   • Sexual activity: Defer         ALLERGIES  Patient has no known allergies.      PHYSICAL EXAM  ED Triage Vitals [05/09/23 1447]   Temp Heart Rate Resp BP SpO2   97.6 °F (36.4 °C) 86 18 140/70 95 %      Temp src Heart Rate Source Patient Position  BP Location FiO2 (%)   -- -- -- -- --       Physical Exam      GENERAL: Elderly frail female in mild distress  HENT: NCAT: nares patent: Neck supple  EYES: no scleral icterus  CV: regular rhythm, normal rate  RESPIRATORY: normal effort  ABDOMEN: soft, NTND: Bowel sounds positive  MUSCULOSKELETAL: no deformity  NEURO: alert and oriented x1 with a nonfocal neuro exam  PSYCH:  calm, cooperative  SKIN: The patient's left leg was wrapped with ABD pads.  I used saline to help remove the ABD pads but still had desquamation with this.  The wound was already markedly desquamated and almost appears degloved from mid left tibia to mid left foot.  The patient's cap refill is approximately 3 but her foot is warm.  There is no extension of cellulitis above or below the desquamation.  Vital signs and nursing notes reviewed.      LAB RESULTS  Recent Results (from the past 24 hour(s))   Green Top (Gel)    Collection Time: 05/09/23  4:28 PM   Result Value Ref Range    Extra Tube Hold for add-ons.    Lavender Top    Collection Time: 05/09/23  4:28 PM   Result Value Ref Range    Extra Tube hold for add-on    Gold Top - SST    Collection Time: 05/09/23  4:28 PM   Result Value Ref Range    Extra Tube Hold for add-ons.    Light Blue Top    Collection Time: 05/09/23  4:28 PM   Result Value Ref Range    Extra Tube Hold for add-ons.    CBC Auto Differential    Collection Time: 05/09/23  4:28 PM    Specimen: Blood   Result Value Ref Range    WBC 26.12 (H) 3.40 - 10.80 10*3/mm3    RBC 4.89 3.77 - 5.28 10*6/mm3    Hemoglobin 14.6 12.0 - 15.9 g/dL    Hematocrit 46.6 34.0 - 46.6 %    MCV 95.3 79.0 - 97.0 fL    MCH 29.9 26.6 - 33.0 pg    MCHC 31.3 (L) 31.5 - 35.7 g/dL    RDW 13.9 12.3 - 15.4 %    RDW-SD 48.8 37.0 - 54.0 fl    MPV 10.1 6.0 - 12.0 fL    Platelets 359 140 - 450 10*3/mm3    Neutrophil % 85.3 (H) 42.7 - 76.0 %    Lymphocyte % 8.7 (L) 19.6 - 45.3 %    Monocyte % 4.6 (L) 5.0 - 12.0 %    Eosinophil % 0.2 (L) 0.3 - 6.2 %    Basophil % 0.4  0.0 - 1.5 %    Immature Grans % 0.8 (H) 0.0 - 0.5 %    Neutrophils, Absolute 22.28 (H) 1.70 - 7.00 10*3/mm3    Lymphocytes, Absolute 2.28 0.70 - 3.10 10*3/mm3    Monocytes, Absolute 1.19 (H) 0.10 - 0.90 10*3/mm3    Eosinophils, Absolute 0.05 0.00 - 0.40 10*3/mm3    Basophils, Absolute 0.10 0.00 - 0.20 10*3/mm3    Immature Grans, Absolute 0.22 (H) 0.00 - 0.05 10*3/mm3    nRBC 0.0 0.0 - 0.2 /100 WBC   Lactic Acid, Plasma    Collection Time: 05/09/23  4:28 PM    Specimen: Blood   Result Value Ref Range    Lactate 2.0 0.5 - 2.0 mmol/L   Procalcitonin    Collection Time: 05/09/23  4:28 PM    Specimen: Blood   Result Value Ref Range    Procalcitonin 0.11 0.00 - 0.25 ng/mL   Comprehensive Metabolic Panel    Collection Time: 05/09/23  5:47 PM    Specimen: Blood   Result Value Ref Range    Glucose 142 (H) 65 - 99 mg/dL    BUN 78 (H) 8 - 23 mg/dL    Creatinine 2.34 (H) 0.57 - 1.00 mg/dL    Sodium 137 136 - 145 mmol/L    Potassium 5.2 3.5 - 5.2 mmol/L    Chloride 103 98 - 107 mmol/L    CO2 18.0 (L) 22.0 - 29.0 mmol/L    Calcium 8.4 8.2 - 9.6 mg/dL    Total Protein 5.9 (L) 6.0 - 8.5 g/dL    Albumin 3.3 (L) 3.5 - 5.2 g/dL    ALT (SGPT) 11 1 - 33 U/L    AST (SGOT) 26 1 - 32 U/L    Alkaline Phosphatase 68 39 - 117 U/L    Total Bilirubin 0.3 0.0 - 1.2 mg/dL    Globulin 2.6 gm/dL    A/G Ratio 1.3 g/dL    BUN/Creatinine Ratio 33.3 (H) 7.0 - 25.0    Anion Gap 16.0 (H) 5.0 - 15.0 mmol/L    eGFR 18.1 (L) >60.0 mL/min/1.73   Urinalysis With Culture If Indicated - Urine, Catheter    Collection Time: 05/09/23  7:33 PM    Specimen: Urine, Catheter   Result Value Ref Range    Color, UA Yellow Yellow, Straw    Appearance, UA Clear Clear    pH, UA <=5.0 5.0 - 8.0    Specific Gravity, UA 1.023 1.005 - 1.030    Glucose, UA Negative Negative    Ketones, UA Trace (A) Negative    Bilirubin, UA Negative Negative    Blood, UA Moderate (2+) (A) Negative    Protein, UA Trace (A) Negative    Leuk Esterase, UA Moderate (2+) (A) Negative    Nitrite, UA  Negative Negative    Urobilinogen, UA 0.2 E.U./dL 0.2 - 1.0 E.U./dL   Urinalysis, Microscopic Only - Urine, Catheter    Collection Time: 05/09/23  7:33 PM    Specimen: Urine, Catheter   Result Value Ref Range    RBC, UA 6-12 (A) None Seen, 0-2 /HPF    WBC, UA 6-12 (A) None Seen, 0-2 /HPF    Bacteria, UA 1+ (A) None Seen /HPF    Squamous Epithelial Cells, UA 0-2 None Seen, 0-2 /HPF    Hyaline Casts, UA 0-2 None Seen /LPF    Methodology Manual Light Microscopy        Ordered the above labs and reviewed the results.        RADIOLOGY  XR Ankle 3+ View Left    Result Date: 5/9/2023  XR ANKLE 3+ VW LEFT-  INDICATIONS: Cellulitis  TECHNIQUE: 4 VIEWS OF THE LEFT ANKLE  COMPARISON: 12/12/2016   FINDINGS:  Osteoporosis is apparent, with interval worsening. No acute fracture, erosion, or dislocation is identified. Mild calcaneal spurring is seen. Soft tissue swelling seen laterally at the ankle. Arterial calcifications are noted. If there is clinical suspicion for radiographically occult osteomyelitis, MRI or bone scan could be obtained.        As described.  This report was finalized on 5/9/2023 3:37 PM by Dr. Don Howe M.D.        Ordered the above noted radiological studies. Reviewed by me in PACS.            PROCEDURES  Procedures          MEDICATIONS GIVEN IN ER  Medications   sodium chloride 0.9 % infusion (125 mL/hr Intravenous New Bag 5/9/23 1905)   vancomycin (VANCOCIN) 1000 mg/200 mL dextrose 5% IVPB (0 mg Intravenous Stopped 5/9/23 1908)   piperacillin-tazobactam (ZOSYN) 3.375 g in iso-osmotic dextrose 50 ml (premix) (0 g Intravenous Stopped 5/9/23 1800)   silver sulfadiazine (SILVADENE, SSD) 1 % cream 1 application (1 application Topical Given 5/9/23 1812)   sodium chloride 0.9 % bolus 500 mL (0 mL Intravenous Stopped 5/9/23 2014)   ondansetron (ZOFRAN) injection 4 mg (4 mg Intravenous Given 5/9/23 1901)   morphine injection 2 mg (2 mg Intravenous Given 5/9/23 1902)             MEDICAL DECISION MAKING,  PROGRESS, and CONSULTS    All labs have been independently reviewed by me.  All radiology studies have been reviewed by me and I have also reviewed the radiology report.   EKG's independently viewed and interpreted by me.  Discussion below represents my analysis of pertinent findings related to patient's condition, differential diagnosis, treatment plan and final disposition.      Additional sources:  - Discussed/ obtained information from independent historians: The patient's daughter is here who states the patient lives at home.  She states she has a living will but states no one has talked with him before about the patient's CODE STATUS    - External (non-ED) record review: I reviewed the patient's note from [facility] from April 10 of this year.    - Chronic or social conditions impacting care: The patient is at 89 years old and lives alone at home    - Shared decision making: After shared decision-making discussion between myself, the patient and her daughter we agree she needs to be admitted to the hospital for further evaluation and care      Orders placed during this visit:  Orders Placed This Encounter   Procedures   • Blood Culture - Blood,   • Blood Culture - Blood,   • Urine Culture - Urine,   • XR Ankle 3+ View Left   • [place] Draw   • Comprehensive Metabolic Panel   • CBC Auto Differential   • Lactic Acid, Plasma   • Procalcitonin   • Urinalysis With Culture If Indicated - Urine, Catheter   • Urinalysis, Microscopic Only - Urine, Clean Catch   • Wound care   • Bladder scan   • IP Palliative Care Nurse Consult   • KELSEY (on-call MD unless specified)   • Inpatient Case Management  Consult   • Consult to Wound / Ostomy Care   • Inpatient Admission   • CBC & Differential   • Green Top (Gel)   • Lavender Top   • Gold Top - SST   • Light Blue Top         Differential diagnosis:  My differential diagnosis includes but is not limited to cellulitis, chronic wound, peripheral  vascular disease, diabetic complication or desquamation      Independent interpretation of labs, radiology studies, and discussions with consultants:  ED Course as of 05/09/23 2214   Tue May 09, 2023   1607 My independent interpretation of the patient's left ankle x-ray is soft tissue swelling with osteoporosis but no fracture or dislocation [GP]   1608 With the patient's desquamation/degloving of her left lower extremity and part of her foot I will treat her with a Silvadene cream, nonstick dressing and IV antibiotics since she is failed 7 days of outpatient antibiotics and care tenders. [GP]   1813 Patient's white count is 26,000.  However her lactic acid and procalcitonin are normal.  She is receiving Zosyn and vancomycin IV and has Silvadene on her skin.  The patient is a very difficult IV stick which has delayed her care. [GP]   1824 The patient's labs are back and show acute renal failure.  I will give the patient IV fluids and check a urine sample. [GP]   1833 On repeat examination advised the patient and her daughter of her significant infection and her acute kidney injury.  Patient is complaining of foot pain and requesting pain medication and thus I will give her 2 mg of morphine.  We will check a bladder scan and cath urine.  I advised him that the patient will need admission.  The daughter states the patient has a living will but that her doctors never discussed a CODE STATUS with her.  Thus I will consult palliative care. [GP]   2024 I discussed the case with Dr. Barrera.  He is aware of the patient's significant cellulitis, acute kidney injury and my discussion with the daughter in regards to the patient's living will and CODE STATUS.  He will admit the patient to a [facility] bed. [GP]      ED Course User Index  [GP] Heath Keller MD               DIAGNOSIS  Final diagnoses:   Cellulitis of left lower extremity   Leukocytosis, unspecified type   Acute kidney injury          DISPOSITION  ADMISSION    Discussed treatment plan and reason for admission with pt/family and admitting physician.  Pt/family voiced understanding of the plan for admission for further testing/treatment as needed.            Latest Documented Vital Signs:  As of 22:14 EDT  BP- 119/61 HR- 90 Temp- 97.6 °F (36.4 °C) (Oral) O2 sat- 94%--      --------------------  Please note that portions of this were completed with a voice recognition program.       Note Disclaimer: At [facility], we believe that sharing information builds trust and better relationships. You are receiving this note because you are receiving care at [facility] or recently visited. It is possible you will see health information before a provider has talked with you about it. This kind of information can be easy to misunderstand. To help you fully understand what it means for your health, we urge you to discuss this note with your provider.           Heath Keller MD  05/09/23 5684

## 2023-05-10 NOTE — PLAN OF CARE
Goal Outcome Evaluation:  Plan of Care Reviewed With: patient        Progress: no change  Outcome Evaluation: New admit for Cellulitis in left foot, home with home health on PO antibiotics and dressing changes at home, IV fluids and antibiotics ordered, VSS, patient did not apain meds, purewick placed, very [place], A&O x4, will CTM

## 2023-05-10 NOTE — CONSULTS
[facility]                                                                                             Nephrology Initial Consult Note    Patient Identification:  Name: Dayanna Ayala MRN: [id number]  Age: 89 y.o. : 10/10/1921  Sex: female  Date:5/10/2023    Requesting Physician: As per consult order.  Reason for Consultation: arf  Information from:patient/ family/ chart      History of Present Illness: This is a 89 y.o. year old female with no prior history of chronic kidney disease, baseline creatinine looks to be around 0.9 however somewhat of a gap between the last labs, the last I see were in 2019.  She reports no history of medical renal disease or renal failure, no history of UTIs or stones.  She has a history of hypertension and is on a thiazide diuretic, does not take any NSAIDs, recently on oral antibiotics for cellulitis.  Medical history otherwise significant for diabetes hypertension hyperlipidemia and neuropathy.  She came to the ER with increasing swelling of her left foot for the last several days despite oral antibiotics.  No fevers or chills she feels like she has been drinking adequate amounts of fluid denies any nausea vomiting diarrhea or abdominal pain.  Initial work-up showed evidence of sepsis with elevated WBC but normal lactate and procalcitonin.  She also had renal failure with initial creatinine of 2.3, already improved down to 1.6 overnight with current treatment.  BNP was 4200.  Her blood pressure has been stable with no hypotension she is afebrile and O2 sats are stable on room air.    The following medical history and medications personally reviewed by me:    Problem List:     Cellulitis of left lower extremity  As per past medical history    Past Medical History:  Past Medical History:   Diagnosis Date   • Diabetes mellitus    • Hyperlipidemia    • Hypertension        Past  Surgical History:  Past Surgical History:   Procedure Laterality Date   • HEMORRHOIDECTOMY     • THROAT SURGERY          Home Meds:   Medications Prior to Admission   Medication Sig Dispense Refill Last Dose   • amLODIPine (NORVASC) 5 MG tablet Take 1 tablet by mouth Daily.      • glimepiride (AMARYL) 1 MG tablet Take 1 tablet by mouth Every Morning Before Breakfast.      • hydrochlorothiazide (HYDRODIURIL) 25 MG tablet Take 1 tablet by mouth Daily.      • metFORMIN (GLUCOPHAGE) 500 MG tablet Take 1 tablet by mouth 2 (Two) Times a Day With Meals.      • simvastatin (ZOCOR) 20 MG tablet Take 1 tablet by mouth Every Night.      • gabapentin (NEURONTIN) 100 MG capsule Take 100 mg by mouth Every Night.          Current Meds:   Current Facility-Administered Medications   Medication Dose Route Frequency Provider Last Rate Last Admin   • aspirin chewable tablet 81 mg  81 mg Oral Daily Manolo Barrera MD       • atorvastatin (LIPITOR) tablet 10 mg  10 mg Oral Nightly Manolo Barrera MD       • [START ON 5/11/2023] castor oil-balsam peru (VENELEX) ointment 1 application  1 application Topical Q12H Manolo Barrera MD       • famotidine (PEPCID) injection 20 mg  20 mg Intravenous Q12H Manolo Barrera MD       • gabapentin (NEURONTIN) capsule 100 mg  100 mg Oral Nightly Manolo Barrera MD       • Menthol-Zinc Oxide 1 application  1 application Topical BID Manolo Barrera MD       • morphine injection 2 mg  2 mg Intravenous Q4H PRN Manolo Barrera MD       • ondansetron (ZOFRAN) injection 4 mg  4 mg Intravenous Q6H PRN Manolo Barrera MD       • Pharmacy to dose vancomycin   Does not apply Continuous PRN Manolo Barrera MD       • piperacillin-tazobactam (ZOSYN) 3.375 g in iso-osmotic dextrose 50 ml (premix)  3.375 g Intravenous Q12H Manolo Barrera MD   3.375 g at 05/09/23 8437   • silver sulfadiazine (SILVADENE, SSD) 1 % cream 1 application  1 application Topical Once Manolo Barrera MD       • sodium chloride 0.9 % infusion  75 mL/hr Intravenous  "Continuous Manolo Barrera MD 75 mL/hr at 05/10/23 0848 75 mL/hr at 05/10/23 0848   • vancomycin (VANCOCIN) 500 mg in sodium chloride 0.9 % 100 mL IVPB-VTB  500 mg Intravenous Once Manolo Barrera MD       • Vancomycin Pharmacy Intermittent/Pulse Dosing   Does not apply Daily Manolo Barrera MD           Allergies:  No Known Allergies    Social History:   Social History     Socioeconomic History   • Marital status:    Tobacco Use   • Smoking status: Never   Vaping Use   • Vaping Use: Never used   Substance and Sexual Activity   • Alcohol use: Never   • Drug use: Never   • Sexual activity: Defer        Family History:  History reviewed. No pertinent family history.     Review of Systems: as per HPI, in addition:    General:      Denies weakness / fatigue,                       No fevers / chills                       no weight loss  HEENT:       no dysphagia / odynophagia  Neck:           normal range of motion, no swelling  Respiratory: no cough / congestion                      No shortness of air                       No wheezing  CV:              No chest pain                       No palpitations  Abdomen/GI: no nausea / vomiting                      No diarrhea / constipation                      No abdominal pain  :             no dysuria / urinary frequency                       No urgency, normal output  Endocrine:   no polyuria / polydipsia,                      No heat or cold intolerance  Skin:          Complains of increasing pain and redness of her right foot   Vascular:   No edema                     No claudication  Psych:        no depression/ anxiety  Neuro:        no focal weakness, no seizures  Musculoskeletal: no joint pain or deformities      Physical Exam:  Vitals:   Temp (24hrs), Av.9 °F (36.6 °C), Min:97.6 °F (36.4 °C), Max:98.4 °F (36.9 °C)    /65 (BP Location: Right arm, Patient Position: Lying)   Pulse 79   Temp 98.4 °F (36.9 °C) (Oral)   Resp 20   Ht 165.1 cm (65\")   Wt " 58.1 kg (128 lb)   SpO2 94%   BMI 21.30 kg/m²   Intake/Output:     Intake/Output Summary (Last 24 hours) at 5/10/2023 1336  Last data filed at 5/10/2023 1312  Gross per 24 hour   Intake 1336.25 ml   Output 100 ml   Net 1236.25 ml        Wt Readings from Last 1 Encounters:   05/09/23 2207 58.1 kg (128 lb)   05/09/23 1623 54.6 kg (120 lb 6.4 oz)       Exam:    General Appearance:  Awake, alert, oriented x3, no acute distress  Chronically ill-appearing   Head and Face:  Normocephalic, atraumatic, mucus membranes moist, oropharynx clear   Eyes:  No icterus, pupils equal round and reactive to light, extraocular movements intact    ENMT: Moist mucosa, tongue symmetric    Neck: Supple  no jugular venous distention  no thyromegaly   Pulmonary:  Respiratory effort: Normal  Auscultation of lungs: Clear bilaterally  No wheezes  No rhonchi  Good air movement, good expansion   Chest wall:  No tenderness or deformity   Cardiovascular:  Auscultation of the heart: Normal rhythm, no murmurs  Feet bandaged bilaterally, minimal edema of bilateral lower extremities   Abdomen:  Abdomen: soft, non-tender, normal bowel sounds all four quadrants, no masses   Liver and spleen: no hepatosplenomegaly   Musculoskeletal: Digits and nails: normal  Normal range of motion  No joint swelling or gross deformities    Skin: Skin inspection: color normal, no visible rashes or lesions  Skin palpation: texture, turgor normal, no palpable lesions   Lymphatic:  no cervical lymphadenopathy    Psychiatric: Judgement and insight: normal  Orientation to person place and time: normal  Mood and affect: normal       DATA:  Radiology and Labs:  The following labs independently reviewed by me, additional AM labs ordered  Old records independently reviewed showing normal baseline creatinine, no prior renal failure  The following radiologic studies independently viewed by me, findings left ankle x-rays showing osteoporosis but no acute fracture or sign of  osteomyelitis  Interval notes, chart personally reviewed by me.  I have reviewed and summarized old records as detailed above  Plan of care discussed with patient and her family member at bedside  New problems include FLAKO, leukocytosis, cellulitis with possible sepsis      Risk/ complexity of medical care/ medical decision making: High risk, new FLAKO with sepsis with need for IV antibiotics and IV fluids, close monitor renal function volume and electrolytes  Chronic illness with severe exacerbation or progression: Cellulitis      Labs:   Recent Results (from the past 24 hour(s))   Green Top (Gel)    Collection Time: 05/09/23  4:28 PM   Result Value Ref Range    Extra Tube Hold for add-ons.    Lavender Top    Collection Time: 05/09/23  4:28 PM   Result Value Ref Range    Extra Tube hold for add-on    Gold Top - SST    Collection Time: 05/09/23  4:28 PM   Result Value Ref Range    Extra Tube Hold for add-ons.    Light Blue Top    Collection Time: 05/09/23  4:28 PM   Result Value Ref Range    Extra Tube Hold for add-ons.    CBC Auto Differential    Collection Time: 05/09/23  4:28 PM    Specimen: Blood   Result Value Ref Range    WBC 26.12 (H) 3.40 - 10.80 10*3/mm3    RBC 4.89 3.77 - 5.28 10*6/mm3    Hemoglobin 14.6 12.0 - 15.9 g/dL    Hematocrit 46.6 34.0 - 46.6 %    MCV 95.3 79.0 - 97.0 fL    MCH 29.9 26.6 - 33.0 pg    MCHC 31.3 (L) 31.5 - 35.7 g/dL    RDW 13.9 12.3 - 15.4 %    RDW-SD 48.8 37.0 - 54.0 fl    MPV 10.1 6.0 - 12.0 fL    Platelets 359 140 - 450 10*3/mm3    Neutrophil % 85.3 (H) 42.7 - 76.0 %    Lymphocyte % 8.7 (L) 19.6 - 45.3 %    Monocyte % 4.6 (L) 5.0 - 12.0 %    Eosinophil % 0.2 (L) 0.3 - 6.2 %    Basophil % 0.4 0.0 - 1.5 %    Immature Grans % 0.8 (H) 0.0 - 0.5 %    Neutrophils, Absolute 22.28 (H) 1.70 - 7.00 10*3/mm3    Lymphocytes, Absolute 2.28 0.70 - 3.10 10*3/mm3    Monocytes, Absolute 1.19 (H) 0.10 - 0.90 10*3/mm3    Eosinophils, Absolute 0.05 0.00 - 0.40 10*3/mm3    Basophils, Absolute 0.10 0.00  - 0.20 10*3/mm3    Immature Grans, Absolute 0.22 (H) 0.00 - 0.05 10*3/mm3    nRBC 0.0 0.0 - 0.2 /100 WBC   Lactic Acid, Plasma    Collection Time: 05/09/23  4:28 PM    Specimen: Blood   Result Value Ref Range    Lactate 2.0 0.5 - 2.0 mmol/L   Procalcitonin    Collection Time: 05/09/23  4:28 PM    Specimen: Blood   Result Value Ref Range    Procalcitonin 0.11 0.00 - 0.25 ng/mL   Comprehensive Metabolic Panel    Collection Time: 05/09/23  5:47 PM    Specimen: Blood   Result Value Ref Range    Glucose 142 (H) 65 - 99 mg/dL    BUN 78 (H) 8 - 23 mg/dL    Creatinine 2.34 (H) 0.57 - 1.00 mg/dL    Sodium 137 136 - 145 mmol/L    Potassium 5.2 3.5 - 5.2 mmol/L    Chloride 103 98 - 107 mmol/L    CO2 18.0 (L) 22.0 - 29.0 mmol/L    Calcium 8.4 8.2 - 9.6 mg/dL    Total Protein 5.9 (L) 6.0 - 8.5 g/dL    Albumin 3.3 (L) 3.5 - 5.2 g/dL    ALT (SGPT) 11 1 - 33 U/L    AST (SGOT) 26 1 - 32 U/L    Alkaline Phosphatase 68 39 - 117 U/L    Total Bilirubin 0.3 0.0 - 1.2 mg/dL    Globulin 2.6 gm/dL    A/G Ratio 1.3 g/dL    BUN/Creatinine Ratio 33.3 (H) 7.0 - 25.0    Anion Gap 16.0 (H) 5.0 - 15.0 mmol/L    eGFR 18.1 (L) >60.0 mL/min/1.73   Urinalysis With Culture If Indicated - Urine, Catheter    Collection Time: 05/09/23  7:33 PM    Specimen: Urine, Catheter   Result Value Ref Range    Color, UA Yellow Yellow, Straw    Appearance, UA Clear Clear    pH, UA <=5.0 5.0 - 8.0    Specific Gravity, UA 1.023 1.005 - 1.030    Glucose, UA Negative Negative    Ketones, UA Trace (A) Negative    Bilirubin, UA Negative Negative    Blood, UA Moderate (2+) (A) Negative    Protein, UA Trace (A) Negative    Leuk Esterase, UA Moderate (2+) (A) Negative    Nitrite, UA Negative Negative    Urobilinogen, UA 0.2 E.U./dL 0.2 - 1.0 E.U./dL   Urinalysis, Microscopic Only - Urine, Catheter    Collection Time: 05/09/23  7:33 PM    Specimen: Urine, Catheter   Result Value Ref Range    RBC, UA 6-12 (A) None Seen, 0-2 /HPF    WBC, UA 6-12 (A) None Seen, 0-2 /HPF     Bacteria, UA 1+ (A) None Seen /HPF    Squamous Epithelial Cells, UA 0-2 None Seen, 0-2 /HPF    Hyaline Casts, UA 0-2 None Seen /LPF    Methodology Manual Light Microscopy    Urine Culture - Urine, Urine, Catheter    Collection Time: 05/09/23  7:33 PM    Specimen: Urine, Catheter   Result Value Ref Range    Urine Culture Culture in progress    Basic Metabolic Panel    Collection Time: 05/10/23  6:15 AM    Specimen: Blood   Result Value Ref Range    Glucose 90 65 - 99 mg/dL    BUN 54 (H) 8 - 23 mg/dL    Creatinine 1.62 (H) 0.57 - 1.00 mg/dL    Sodium 138 136 - 145 mmol/L    Potassium 3.6 3.5 - 5.2 mmol/L    Chloride 105 98 - 107 mmol/L    CO2 23.1 22.0 - 29.0 mmol/L    Calcium 8.2 8.2 - 9.6 mg/dL    BUN/Creatinine Ratio 33.3 (H) 7.0 - 25.0    Anion Gap 9.9 5.0 - 15.0 mmol/L    eGFR 28.1 (L) >60.0 mL/min/1.73   CBC Auto Differential    Collection Time: 05/10/23  6:15 AM    Specimen: Blood   Result Value Ref Range    WBC 26.21 (H) 3.40 - 10.80 10*3/mm3    RBC 4.59 3.77 - 5.28 10*6/mm3    Hemoglobin 13.6 12.0 - 15.9 g/dL    Hematocrit 42.9 34.0 - 46.6 %    MCV 93.5 79.0 - 97.0 fL    MCH 29.6 26.6 - 33.0 pg    MCHC 31.7 31.5 - 35.7 g/dL    RDW 13.5 12.3 - 15.4 %    RDW-SD 45.6 37.0 - 54.0 fl    MPV 10.0 6.0 - 12.0 fL    Platelets 269 140 - 450 10*3/mm3    Neutrophil % 82.5 (H) 42.7 - 76.0 %    Lymphocyte % 9.5 (L) 19.6 - 45.3 %    Monocyte % 6.3 5.0 - 12.0 %    Eosinophil % 0.3 0.3 - 6.2 %    Basophil % 0.3 0.0 - 1.5 %    Immature Grans % 1.1 (H) 0.0 - 0.5 %    Neutrophils, Absolute 21.60 (H) 1.70 - 7.00 10*3/mm3    Lymphocytes, Absolute 2.50 0.70 - 3.10 10*3/mm3    Monocytes, Absolute 1.65 (H) 0.10 - 0.90 10*3/mm3    Eosinophils, Absolute 0.09 0.00 - 0.40 10*3/mm3    Basophils, Absolute 0.07 0.00 - 0.20 10*3/mm3    Immature Grans, Absolute 0.30 (H) 0.00 - 0.05 10*3/mm3    nRBC 0.0 0.0 - 0.2 /100 WBC   Vancomycin, Random    Collection Time: 05/10/23  6:15 AM    Specimen: Blood   Result Value Ref Range    Vancomycin Random  9.50 5.00 - 40.00 mcg/mL   POC Glucose Once    Collection Time: 05/10/23  7:24 AM    Specimen: Blood   Result Value Ref Range    Glucose 86 70 - 130 mg/dL       Radiology:  Imaging Results (Last 24 Hours)     Procedure Component Value Units Date/Time    XR Ankle 3+ View Left [[id number]] Collected: 05/09/23 1534     Updated: 05/09/23 1540    Narrative:      XR ANKLE 3+ VW LEFT-     INDICATIONS: Cellulitis     TECHNIQUE: 4 VIEWS OF THE LEFT ANKLE     COMPARISON: 12/12/2016        FINDINGS:     Osteoporosis is apparent, with interval worsening. No acute fracture,  erosion, or dislocation is identified. Mild calcaneal spurring is seen.  Soft tissue swelling seen laterally at the ankle. Arterial  calcifications are noted. If there is clinical suspicion for  radiographically occult osteomyelitis, MRI or bone scan could be  obtained.          Impression:         As described.     This report was finalized on 5/9/2023 3:37 PM by Dr. Don Howe M.D.                  ASSESSMENT:   New acute kidney injury, likely prerenal in the setting of cellulitis, possibly some element of antibiotic induced nephrotoxicity.  Creatinine already is improving with IV fluids, baseline looks to be around 0.9.  No prior history of any chronic kidney disease  Volume depletion/dehydration, on IV fluids  Cellulitis with sepsis  Leukocytosis  Chronic hypertension on a thiazide diuretic  Hyperlipidemia  Diabetes mellitus on metformin and glimepiride      DISCUSSION/PLAN:   Renal function already seems to be improving and she is nonoliguric  Continue to hold metformin and HCTZ  We will continue current IV fluids  Check urine electrolytes, will hold off on renal ultrasound since she seems to be improving, low clinical suspicion for obstruction  Monitor BP closely, antihypertensive meds currently on hold  Follow-up a.m. labs  Discussed with family at bedside   monitor vancomycin levels closely, especially while on IV Zosyn, monitor for any signs  of potential nephrotoxicity      Continue to monitor electrolytes and volume closely, avoid IV contrast and nephrotoxic medications     I appreciate the consult request  Please send me a secure chat message if any questions regarding patient care.      Sterling Chatterjee M.D  [facility]  Office phone number: [phone]  Answering service phone number: [phone]      5/10/2023        Dictation via Dragon dictation software

## 2023-05-10 NOTE — PLAN OF CARE
Goal Outcome Evaluation:  Plan of Care Reviewed With: patient      Progress: improving  Outcome Evaluation: VSS, slept well during shift, denies c/o pain, CWOCN change drsg, neph consult, ID consult, pt's daughter at bedside

## 2023-05-10 NOTE — NURSING NOTE
1435: Dr. KYRA Chatterjee aware of consult    1308: Have attempted several times to call nephrology consult in(1217, 1245, 1305), however, repeatedly, no answer. The number rings busy & then disconnects. Will continue to try.

## 2023-05-10 NOTE — PROGRESS NOTES
"[facility] Clinical Pharmacy Services: Vancomycin Initial Consult Note    Dayanna Ayala is a 89 y.o. female who is on day 1 of pharmacy to dose vancomycin.    Indication: Sepsis  Consulting Provider: Dr. Barrera  Planned Duration of Therapy: 5 days  Loading Dose Ordered or Given: 1000 mg on 5/9 at 1752  MRSA PCR performed: NO  Culture/Source: Bcx pending Ucx pending  Target: Dose by Levels  Pertinent Vanc Dosing History:   Other Antimicrobials: Zosyn    Vitals/Labs  Ht: 165.1 cm (65\"); Wt: 58.1 kg (128 lb)  Temp Readings from Last 1 Encounters:   05/10/23 98 °F (36.7 °C) (Oral)    Estimated Creatinine Clearance: 16.5 mL/min (A) (by C-G formula based on SCr of 1.62 mg/dL (H)).        Results from last 7 days   Lab Units 05/10/23  0615 05/09/23  1747 05/09/23  1628   CREATININE mg/dL 1.62* 2.34*  --    WBC 10*3/mm3 26.21*  --  26.12*     Assessment/Plan:    Vancomycin Dose: Intermittent dosing due  FLAKO. VR at goal for redosing, will give 750mg x 1 dose  Vanc Random has been ordered for 5/11 @ 0600    Pharmacy will follow patient's kidney function and will adjust doses and obtain levels as necessary. Thank you for involving pharmacy in this patient's care. Please contact pharmacy with any questions or concerns.                           Kylie Bates, [facility]  Clinical Pharmacist     "

## 2023-05-10 NOTE — NURSING NOTE
05/10/23 0920   Wound 05/10/23 0920 coccyx Pressure Injury   Placement Date/Time: 05/10/23 0920   Present on Hospital Admission: Yes  Location: coccyx  Primary Wound Type: Pressure Injury   Pressure Injury Stage DTPI   Dressing Appearance moist drainage   Base non-blanchable;maroon/purple   Periwound intact;redness;swelling   Periwound Temperature warm   Periwound Skin Turgor soft   Wound 05/10/23 0920 Left heel Pressure Injury   Placement Date/Time: 05/10/23 0920   Present on Hospital Admission: Yes  Side: Left  Location: heel  Primary Wound Type: Pressure Injury   Pressure Injury Stage DTPI   Base non-blanchable;maroon/purple  (Mushy, tender to touch)   Periwound intact;redness;swelling   Periwound Temperature warm   Periwound Skin Turgor soft   Wound 05/10/23 Right heel Pressure Injury   Placement Date: 05/10/23   Present on Hospital Admission: Yes  Side: Right  Location: heel  Primary Wound Type: Pressure Injury   Pressure Injury Stage DTPI   Base non-blanchable;maroon/purple  (Mushy, tender to touch)   Periwound intact;redness;swelling   Periwound Temperature warm   Periwound Skin Turgor soft        Wound/Ostomy: Consult received regarding skin issue on Bilateral lower leg. Patient is alert, oriented, able to repositioning with assistance, upon assessment we could see large open wound to left lower leg,  red, yellowish exudate, malodorous, tender to touch, Xeroform dressing/ABD pad/wrapped Kerlix placed and ordered to bilateral open wound.   In addition is observed purple localized, no blanchable area on Coccyx,  DTI POA evolving to into an open blister, and DTI POA too on bilateral Heel.  Wound care order and Pressure ulcer standing measure have been implemented.  Patient will need low air loss mattress upon transfer out of observation unit for adequate pressure redistribution allowing better circulation an pressure relief.  Rn notified.   Please re-consult for any additional needs.

## 2023-05-10 NOTE — H&P
"History and physical    Primary care physician  Dr. Walker    Chief complaint  Left foot swelling redness pain    History of present illness  [age]-year-old white female with history of diabetes hypertension hyperlipidemia and neuropathy brought to the emergency room by the family with left foot swelling redness pain going on for last few days to week with no improvement with oral antibiotics.  Patient denies any fever chills chest pain shortness of breath abdominal pain nausea vomiting diarrhea.  Patient work-up in ER revealed acute kidney injury and also cellulitis with sepsis admit for management.  Patient is DNR per her wishes.     PAST MEDICAL HISTORY  • Diabetes mellitus     • Hyperlipidemia     • Hypertension        PAST SURGICAL HISTORY              Procedure Laterality Date   • HEMORRHOIDECTOMY       • THROAT SURGERY             FAMILY HISTORY  History reviewed. No pertinent family history.     SOCIAL HISTORY                Socioeconomic History   • Marital status:    Tobacco Use   • Smoking status: Never   Vaping Use   • Vaping Use: Never used   Substance and Sexual Activity   • Alcohol use: Never   • Drug use: Never   • Sexual activity: Defer         ALLERGIES  Patient has no known allergies.  Home medications reviewed     REVIEW OF SYSTEMS  All systems reviewed and negative except for those discussed in HPI.     PHYSICAL EXAM  Blood pressure 115/65, pulse 79, temperature 98.4 °F (36.9 °C), temperature source Oral, resp. rate 20, height 165.1 cm (65\"), weight 58.1 kg (128 lb), SpO2 94 %.    GENERAL:  Awake and alert in no distress   HEENT:  Unremarkable  NECK:  Supple  CV: regular rhythm, normal rate  RESPIRATORY: normal effort and moving air bilaterally  ABDOMEN: soft, NTND: Bowel sounds positive  MUSCULOSKELETAL: no deformity  NEURO: alert and oriented x1 with a nonfocal neuro exam  PSYCH:  calm, cooperative  SKIN: The patient's left leg was wrapped with ABD pads.       LAB RESULTS  Lab Results " (last 24 hours)     Procedure Component Value Units Date/Time    Urine Culture - Urine, Urine, Catheter [[id number]]  (Normal) Collected: 05/09/23 1933    Specimen: Urine, Catheter Updated: 05/10/23 1013     Urine Culture Culture in progress    POC Glucose Once [[id number]]  (Normal) Collected: 05/10/23 0724    Specimen: Blood Updated: 05/10/23 0726     Glucose 86 mg/dL      Comment: Meter: [id number] : [id number] Naun MARTINEZ       Basic Metabolic Panel [[id number]]  (Abnormal) Collected: 05/10/23 0615    Specimen: Blood Updated: 05/10/23 0709     Glucose 90 mg/dL      BUN 54 mg/dL      Creatinine 1.62 mg/dL      Sodium 138 mmol/L      Potassium 3.6 mmol/L      Comment: Slight hemolysis detected by analyzer. Results may be affected.        Chloride 105 mmol/L      CO2 23.1 mmol/L      Calcium 8.2 mg/dL      BUN/Creatinine Ratio 33.3     Anion Gap 9.9 mmol/L      eGFR 28.1 mL/min/1.73     Narrative:      GFR Normal >60  Chronic Kidney Disease <60  Kidney Failure <15    The GFR formula is only valid for adults with stable renal function between ages 18 and 70.    Vancomycin, Random [[id number]]  (Normal) Collected: 05/10/23 0615    Specimen: Blood Updated: 05/10/23 0708     Vancomycin Random 9.50 mcg/mL     Narrative:      Therapeutic Ranges for Vancomycin    Vancomycin Random   5.0-40.0 mcg/mL  Vancomycin Trough   5.0-20.0 mcg/mL  Vancomycin Peak     20.0-40.0 mcg/mL    CBC & Differential [[id number]]  (Abnormal) Collected: 05/10/23 0615    Specimen: Blood Updated: 05/10/23 0649    Narrative:      The following orders were created for panel order CBC & Differential.  Procedure                               Abnormality         Status                     ---------                               -----------         ------                     CBC Auto Differential[[id number]]        Abnormal            Final result                 Please view results for these tests on the individual orders.    CBC Auto Differential  [[id number]]  (Abnormal) Collected: 05/10/23 0615    Specimen: Blood Updated: 05/10/23 0649     WBC 26.21 10*3/mm3      RBC 4.59 10*6/mm3      Hemoglobin 13.6 g/dL      Hematocrit 42.9 %      MCV 93.5 fL      MCH 29.6 pg      MCHC 31.7 g/dL      RDW 13.5 %      RDW-SD 45.6 fl      MPV 10.0 fL      Platelets 269 10*3/mm3      Neutrophil % 82.5 %      Lymphocyte % 9.5 %      Monocyte % 6.3 %      Eosinophil % 0.3 %      Basophil % 0.3 %      Immature Grans % 1.1 %      Neutrophils, Absolute 21.60 10*3/mm3      Lymphocytes, Absolute 2.50 10*3/mm3      Monocytes, Absolute 1.65 10*3/mm3      Eosinophils, Absolute 0.09 10*3/mm3      Basophils, Absolute 0.07 10*3/mm3      Immature Grans, Absolute 0.30 10*3/mm3      nRBC 0.0 /100 WBC     Urinalysis With Culture If Indicated - Urine, Catheter [[id number]]  (Abnormal) Collected: 05/09/23 1933    Specimen: Urine, Catheter Updated: 05/09/23 1958     Color, UA Yellow     Appearance, UA Clear     pH, UA <=5.0     Specific Gravity, UA 1.023     Glucose, UA Negative     Ketones, UA Trace     Bilirubin, UA Negative     Blood, UA Moderate (2+)     Protein, UA Trace     Leuk Esterase, UA Moderate (2+)     Nitrite, UA Negative     Urobilinogen, UA 0.2 E.U./dL    Narrative:      In absence of clinical symptoms, the presence of pyuria, bacteria, and/or nitrites on the urinalysis result does not correlate with infection.    Urinalysis, Microscopic Only - Urine, Catheter [[id number]]  (Abnormal) Collected: 05/09/23 1933    Specimen: Urine, Catheter Updated: 05/09/23 1958     RBC, UA 6-12 /HPF      WBC, UA 6-12 /HPF      Bacteria, UA 1+ /HPF      Squamous Epithelial Cells, UA 0-2 /HPF      Hyaline Casts, UA 0-2 /LPF      Methodology Manual Light Microscopy    Comprehensive Metabolic Panel [[id number]]  (Abnormal) Collected: 05/09/23 1747    Specimen: Blood Updated: 05/09/23 1816     Glucose 142 mg/dL      BUN 78 mg/dL      Creatinine 2.34 mg/dL      Sodium 137 mmol/L      Potassium 5.2 mmol/L       Comment: Specimen hemolyzed.  Results may be affected.        Chloride 103 mmol/L      CO2 18.0 mmol/L      Calcium 8.4 mg/dL      Total Protein 5.9 g/dL      Albumin 3.3 g/dL      ALT (SGPT) 11 U/L      Comment: Specimen hemolyzed.  Results may be affected.        AST (SGOT) 26 U/L      Comment: Specimen hemolyzed.  Results may be affected.        Alkaline Phosphatase 68 U/L      Total Bilirubin 0.3 mg/dL      Globulin 2.6 gm/dL      A/G Ratio 1.3 g/dL      BUN/Creatinine Ratio 33.3     Anion Gap 16.0 mmol/L      eGFR 18.1 mL/min/1.73     Narrative:      GFR Normal >60  Chronic Kidney Disease <60  Kidney Failure <15    The GFR formula is only valid for adults with stable renal function between ages 18 and 70.    [place] Draw [[id number]] Collected: 05/09/23 1628    Specimen: Blood Updated: 05/09/23 1730    Narrative:      The following orders were created for panel order [place] Draw.  Procedure                               Abnormality         Status                     ---------                               -----------         ------                     Green Top (Gel)[[id number]]                                  Final result               Lavender Top[[id number]]                                     Final result               Gold Top - SST[[id number]]                                   Final result               Light Blue Top[[id number]]                                   Final result                 Please view results for these tests on the individual orders.    Gold Top - SST [[id number]] Collected: 05/09/23 1628    Specimen: Blood Updated: 05/09/23 1730     Extra Tube Hold for add-ons.     Comment: Auto resulted.       Green Top (Gel) [[id number]] Collected: 05/09/23 1628    Specimen: Blood Updated: 05/09/23 1730     Extra Tube Hold for add-ons.     Comment: Auto resulted.       Lavender Top [[id number]] Collected: 05/09/23 1628    Specimen: Blood Updated: 05/09/23 1730     Extra Tube hold for add-on      "Comment: Auto resulted       Light Blue Top [[id number]] Collected: 05/09/23 1628    Specimen: Blood Updated: 05/09/23 1730     Extra Tube Hold for add-ons.     Comment: Auto resulted       Blood Culture - Blood, Arm, Right [[id number]] Collected: 05/09/23 1706    Specimen: Blood from Arm, Right Updated: 05/09/23 1712    Procalcitonin [[id number]]  (Normal) Collected: 05/09/23 1628    Specimen: Blood Updated: 05/09/23 1709     Procalcitonin 0.11 ng/mL     Narrative:      As a Marker for Sepsis (Non-Neonates):    1. <0.5 ng/mL represents a low risk of severe sepsis and/or septic shock.  2. >2 ng/mL represents a high risk of severe sepsis and/or septic shock.    As a Marker for Lower Respiratory Tract Infections that require antibiotic therapy:    PCT on Admission    Antibiotic Therapy       6-12 Hrs later    >0.5                Strongly Recommended  >0.25 - <0.5        Recommended   0.1 - 0.25          Discouraged              Remeasure/reassess PCT  <0.1                Strongly Discouraged     Remeasure/reassess PCT    As 28 day mortality risk marker: \"Change in Procalcitonin Result\" (>80% or <=80%) if Day 0 (or Day 1) and Day 4 values are available. Refer to http://www.[facility]-pct-calculator.com    Change in PCT <=80%  A decrease of PCT levels below or equal to 80% defines a positive change in PCT test result representing a higher risk for 28-day all-cause mortality of patients diagnosed with severe sepsis for septic shock.    Change in PCT >80%  A decrease of PCT levels of more than 80% defines a negative change in PCT result representing a lower risk for 28-day all-cause mortality of patients diagnosed with severe sepsis or septic shock.       Lactic Acid, Plasma [[id number]]  (Normal) Collected: 05/09/23 1628    Specimen: Blood Updated: 05/09/23 1658     Lactate 2.0 mmol/L     Blood Culture - Blood, Arm, Left [[id number]] Collected: 05/09/23 1646    Specimen: Blood from Arm, Left Updated: 05/09/23 1652    CBC & " Differential [[id number]]  (Abnormal) Collected: 05/09/23 1628    Specimen: Blood Updated: 05/09/23 1642    Narrative:      The following orders were created for panel order CBC & Differential.  Procedure                               Abnormality         Status                     ---------                               -----------         ------                     CBC Auto Differential[[id number]]        Abnormal            Final result                 Please view results for these tests on the individual orders.    CBC Auto Differential [[id number]]  (Abnormal) Collected: 05/09/23 1628    Specimen: Blood Updated: 05/09/23 1642     WBC 26.12 10*3/mm3      RBC 4.89 10*6/mm3      Hemoglobin 14.6 g/dL      Hematocrit 46.6 %      MCV 95.3 fL      MCH 29.9 pg      MCHC 31.3 g/dL      RDW 13.9 %      RDW-SD 48.8 fl      MPV 10.1 fL      Platelets 359 10*3/mm3      Neutrophil % 85.3 %      Lymphocyte % 8.7 %      Monocyte % 4.6 %      Eosinophil % 0.2 %      Basophil % 0.4 %      Immature Grans % 0.8 %      Neutrophils, Absolute 22.28 10*3/mm3      Lymphocytes, Absolute 2.28 10*3/mm3      Monocytes, Absolute 1.19 10*3/mm3      Eosinophils, Absolute 0.05 10*3/mm3      Basophils, Absolute 0.10 10*3/mm3      Immature Grans, Absolute 0.22 10*3/mm3      nRBC 0.0 /100 WBC         Imaging Results (Last 24 Hours)     Procedure Component Value Units Date/Time    XR Ankle 3+ View Left [[id number]] Collected: 05/09/23 1534     Updated: 05/09/23 1540    Narrative:      XR ANKLE 3+ VW LEFT-     INDICATIONS: Cellulitis     TECHNIQUE: 4 VIEWS OF THE LEFT ANKLE     COMPARISON: 12/12/2016        FINDINGS:     Osteoporosis is apparent, with interval worsening. No acute fracture,  erosion, or dislocation is identified. Mild calcaneal spurring is seen.  Soft tissue swelling seen laterally at the ankle. Arterial  calcifications are noted. If there is clinical suspicion for  radiographically occult osteomyelitis, MRI or bone scan could  be  obtained.          Impression:         As described.     This report was finalized on 5/9/2023 3:37 PM by Dr. Don Howe M.D.             Current Facility-Administered Medications:   •  aspirin chewable tablet 81 mg, 81 mg, Oral, Daily, Manolo Barrera MD  •  atorvastatin (LIPITOR) tablet 10 mg, 10 mg, Oral, Nightly, Manolo Barrera MD  •  [START ON 5/11/2023] castor oil-balsam peru (VENELEX) ointment 1 application, 1 application, Topical, Q12H, Manolo Barrera MD  •  famotidine (PEPCID) injection 20 mg, 20 mg, Intravenous, Q12H, Manolo Barrera MD  •  gabapentin (NEURONTIN) capsule 100 mg, 100 mg, Oral, Nightly, Manolo Barrera MD  •  Menthol-Zinc Oxide 1 application, 1 application, Topical, BID, Manolo Barrera MD  •  morphine injection 2 mg, 2 mg, Intravenous, Q4H PRN, Manolo Barrera MD  •  ondansetron (ZOFRAN) injection 4 mg, 4 mg, Intravenous, Q6H PRN, Manolo Barrera MD  •  Pharmacy to dose vancomycin, , Does not apply, Continuous PRN, Manolo Barrera MD  •  piperacillin-tazobactam (ZOSYN) 3.375 g in iso-osmotic dextrose 50 ml (premix), 3.375 g, Intravenous, Q12H, Manolo Barrera MD, 3.375 g at 05/09/23 2346  •  silver sulfadiazine (SILVADENE, SSD) 1 % cream 1 application, 1 application, Topical, Once, Manolo Barrera MD  •  sodium chloride 0.9 % infusion, 75 mL/hr, Intravenous, Continuous, Manolo Barrera MD, Last Rate: 75 mL/hr at 05/10/23 0848, 75 mL/hr at 05/10/23 0848  •  vancomycin (VANCOCIN) 500 mg in sodium chloride 0.9 % 100 mL IVPB-VTB, 500 mg, Intravenous, Once, Manolo Barrera MD  •  Vancomycin Pharmacy Intermittent/Pulse Dosing, , Does not apply, Daily, Manolo Barrera MD     ASSESSMENT  Left lower extremity cellulitis with sepsis  Acute kidney injury  Diabetes mellitus   Hypertension  Hyperlipidemia  Neuropathy    PLAN  Admit  IVF  Broad-spectrum IV antibiotics after obtaining the blood cultures  Infectious disease consult  Nephrology to follow patient  Discontinue HCTZ Glucophage and Norvasc  Stress ulcer DVT  prophylaxis  Accu-Chek with sliding scale insulin  Supportive care  DNR  Discussed with nursing staff  Follow closely further recommendation current hospital course    ANTWAN ARIAS MD

## 2023-05-10 NOTE — PROGRESS NOTES
"[facility] Clinical Pharmacy Services: Vancomycin & ZOSyn  Initial Consult Note    Dayanna Ayala is a 89 y.o. female who is on day 1 of pharmacy to dose vancomycin.    Indication: Sepsis  Consulting Provider: Dr. Barrera  Planned Duration of Therapy: 5 days  Loading Dose Ordered or Given: 1000 mg on 5/9 at 1752  MRSA PCR performed: NO  Culture/Source: Bcx pending Ucx pending  Target: Dose by Levels  Pertinent Vanc Dosing History:   Other Antimicrobials: Zosyn    Vitals/Labs  Ht: 165.1 cm (65\"); Wt: 58.1 kg (128 lb)  Temp Readings from Last 1 Encounters:   05/09/23 97.6 °F (36.4 °C) (Oral)    Estimated Creatinine Clearance: 11.4 mL/min (A) (by C-G formula based on SCr of 2.34 mg/dL (H)).        Results from last 7 days   Lab Units 05/09/23  1747 05/09/23  1628   CREATININE mg/dL 2.34*  --    WBC 10*3/mm3  --  26.12*     Assessment/Plan:    Vancomycin Dose:   Intermittent dosing due  FLAKO  Vanc Random has been ordered for 5/10 at am labs  Start Zosyn 3.375 g IVq12h extended infusion     Pharmacy will follow patient's kidney function and will adjust doses and obtain levels as necessary. Thank you for involving pharmacy in this patient's care. Please contact pharmacy with any questions or concerns.                           Edgard Alatorre [facility]  Clinical Pharmacist   "

## 2023-05-11 NOTE — PROGRESS NOTES
LOS: 2 days     Chief Complaint/ Reason for encounter: FLAKO    Subjective   05/11/23 : No new complaints but much more confused today  Difficult to get much additional history  Palliative care has met with family and they are considering options depending on her progress this admission      Medical history reviewed:  History of Present Illness    Subjective    History taken from: Patient and chart    Vital Signs  Temp:  [97.6 °F (36.4 °C)-97.9 °F (36.6 °C)] 97.9 °F (36.6 °C)  Heart Rate:  [77-94] 83  Resp:  [16-18] 18  BP: (115-140)/(48-92) 128/57       Wt Readings from Last 1 Encounters:   05/09/23 2207 58.1 kg (128 lb)   05/09/23 1623 54.6 kg (120 lb 6.4 oz)       Objective    Objective:  General Appearance:  Comfortable, confused, chronically ill-appearing, in no acute distress and not in pain.  Awake, not oriented  HEENT: Mucous membranes moist, no injury, oropharynx clear  Lungs:  Normal effort and normal respiratory rate.  Breath sounds clear to auscultation.  No  respiratory distress.  No rales, decreased breath sounds or rhonchi.    Heart: Normal rate.  Regular rhythm.  S1, S2 normal.  No murmur.   Abdomen: Abdomen is soft.  Bowel sounds are normal, no abdominal tenderness.  There is no rebound or guarding  Extremities: No significant edema of bilateral lower extremities, legs wrapped  Neurological: No focal motor or sensory deficits, pupils reactive  Skin:  Warm and dry.  No rash or cyanosis.       Results Review:    Intake/Output:     Intake/Output Summary (Last 24 hours) at 5/11/2023 1330  Last data filed at 5/11/2023 1051  Gross per 24 hour   Intake 2518.5 ml   Output 700 ml   Net 1818.5 ml         DATA:  Radiology and Labs:  The following labs independently reviewed by me. Additional labs ordered for tomorrow a.m.  Interval notes, chart personally reviewed by me.   Old records independently reviewed showing normal baseline creatinine  The following radiologic studies independently viewed by me,  findings ABIs were normal bilateral lower extremities  New problems include FLAKO, new hypokalemia and hypophosphatemia  Discussed with patient moderate to high, new electrolyte abnormalities requiring IV replacement, continued IV fluids    Risk/ complexity of medical care/ medical decision making high complexity, new electrolyte abnormalities requiring IV replacement and continued IV antibiotics, monitoring of renal failure    Labs:   Recent Results (from the past 24 hour(s))   BNP    Collection Time: 05/10/23  2:36 PM    Specimen: Blood   Result Value Ref Range    proBNP 4,270.0 (H) 0.0 - 1,800.0 pg/mL   CK    Collection Time: 05/10/23  2:36 PM    Specimen: Blood   Result Value Ref Range    Creatine Kinase 58 20 - 180 U/L   POC Glucose Once    Collection Time: 05/10/23  4:17 PM    Specimen: Blood   Result Value Ref Range    Glucose 94 70 - 130 mg/dL   POC Glucose Once    Collection Time: 05/10/23  8:28 PM    Specimen: Blood   Result Value Ref Range    Glucose 90 70 - 130 mg/dL   Sodium, Urine, Random - Urine, Clean Catch    Collection Time: 05/10/23 10:33 PM    Specimen: Urine, Clean Catch   Result Value Ref Range    Sodium, Urine 24 mmol/L   Protein, Urine, Random - Urine, Clean Catch    Collection Time: 05/10/23 10:33 PM    Specimen: Urine, Clean Catch   Result Value Ref Range    Total Protein, Urine 14.4 mg/dL   Eosinophil Smear - Urine, Urine, Clean Catch    Collection Time: 05/10/23 10:33 PM    Specimen: Urine, Clean Catch   Result Value Ref Range    Eosinophil Smear 0 0 - 0 % EOS/100 Cells   Creatinine Urine Random (kidney function) GFR component - Urine, Clean Catch    Collection Time: 05/10/23 10:33 PM    Specimen: Urine, Clean Catch   Result Value Ref Range    Creatinine, Urine 85.3 mg/dL   POC Glucose Once    Collection Time: 05/11/23  6:01 AM    Specimen: Blood   Result Value Ref Range    Glucose 73 70 - 130 mg/dL   Vancomycin, Random    Collection Time: 05/11/23  7:35 AM    Specimen: Blood   Result Value  Ref Range    Vancomycin Random 8.40 5.00 - 40.00 mcg/mL   TSH    Collection Time: 05/11/23  7:35 AM    Specimen: Blood   Result Value Ref Range    TSH 0.708 0.270 - 4.200 uIU/mL   Lipid Panel    Collection Time: 05/11/23  7:35 AM    Specimen: Blood   Result Value Ref Range    Total Cholesterol 99 0 - 200 mg/dL    Triglycerides 120 0 - 150 mg/dL    HDL Cholesterol 46 40 - 60 mg/dL    LDL Cholesterol  32 0 - 100 mg/dL    VLDL Cholesterol 21 5 - 40 mg/dL    LDL/HDL Ratio 0.63    Hemoglobin A1c    Collection Time: 05/11/23  7:35 AM    Specimen: Blood   Result Value Ref Range    Hemoglobin A1C 6.20 (H) 4.80 - 5.60 %   Uric Acid    Collection Time: 05/11/23  7:35 AM    Specimen: Blood   Result Value Ref Range    Uric Acid 5.7 2.4 - 5.7 mg/dL   Renal Function Panel    Collection Time: 05/11/23  7:35 AM    Specimen: Blood   Result Value Ref Range    Glucose 83 65 - 99 mg/dL    BUN 33 (H) 8 - 23 mg/dL    Creatinine 1.14 (H) 0.57 - 1.00 mg/dL    Sodium 144 136 - 145 mmol/L    Potassium 3.3 (L) 3.5 - 5.2 mmol/L    Chloride 112 (H) 98 - 107 mmol/L    CO2 22.7 22.0 - 29.0 mmol/L    Calcium 8.2 8.2 - 9.6 mg/dL    Albumin 2.7 (L) 3.5 - 5.2 g/dL    Phosphorus 2.1 (L) 2.5 - 4.5 mg/dL    Anion Gap 9.3 5.0 - 15.0 mmol/L    BUN/Creatinine Ratio 28.9 (H) 7.0 - 25.0    eGFR 42.8 (L) >60.0 mL/min/1.73   CBC Auto Differential    Collection Time: 05/11/23  7:35 AM    Specimen: Blood   Result Value Ref Range    WBC 16.69 (H) 3.40 - 10.80 10*3/mm3    RBC 4.67 3.77 - 5.28 10*6/mm3    Hemoglobin 13.8 12.0 - 15.9 g/dL    Hematocrit 44.9 34.0 - 46.6 %    MCV 96.1 79.0 - 97.0 fL    MCH 29.6 26.6 - 33.0 pg    MCHC 30.7 (L) 31.5 - 35.7 g/dL    RDW 13.5 12.3 - 15.4 %    RDW-SD 48.1 37.0 - 54.0 fl    MPV 9.9 6.0 - 12.0 fL    Platelets 240 140 - 450 10*3/mm3    Neutrophil % 82.8 (H) 42.7 - 76.0 %    Lymphocyte % 8.9 (L) 19.6 - 45.3 %    Monocyte % 5.7 5.0 - 12.0 %    Eosinophil % 1.1 0.3 - 6.2 %    Basophil % 0.4 0.0 - 1.5 %    Immature Grans % 1.1 (H)  0.0 - 0.5 %    Neutrophils, Absolute 13.83 (H) 1.70 - 7.00 10*3/mm3    Lymphocytes, Absolute 1.48 0.70 - 3.10 10*3/mm3    Monocytes, Absolute 0.95 (H) 0.10 - 0.90 10*3/mm3    Eosinophils, Absolute 0.18 0.00 - 0.40 10*3/mm3    Basophils, Absolute 0.07 0.00 - 0.20 10*3/mm3    Immature Grans, Absolute 0.18 (H) 0.00 - 0.05 10*3/mm3    nRBC 0.0 0.0 - 0.2 /100 WBC   Doppler Ankle Brachial Index Single Level CAR    Collection Time: 05/11/23  9:55 AM   Result Value Ref Range    Target HR (85%) 101 bpm    Max. Pred. HR (100%) 119 bpm    Upper arterial right arm brachial sys max 127 mmHg    RIGHT GREAT TOE SYS MAX 64     LEFT GREAT TOE SYS MAX 84     RIGHT TBI RATIO 0.50     LEFT TBI RATIO 0.66    MRSA Screen, PCR (Inpatient) - Swab, Nares    Collection Time: 05/11/23 10:58 AM    Specimen: Nares; Swab   Result Value Ref Range    MRSA PCR No MRSA Detected No MRSA Detected   POC Glucose Once    Collection Time: 05/11/23 11:22 AM    Specimen: Blood   Result Value Ref Range    Glucose 131 (H) 70 - 130 mg/dL       Radiology:  Pertinent radiology studies were reviewed as described above      Medications have been reviewed separately in chart overview      ASSESSMENT:  acute kidney injury, appears to be predominantly prerenal, improved with IV fluids, nearing baseline of 0.9  New hypokalemia  New hypophosphatemia  Volume depletion/dehydration, on IV fluids  Cellulitis with sepsis  Leukocytosis  Chronic hypertension on a thiazide diuretic  Hyperlipidemia  Diabetes mellitus on metformin and glimepiride        DISCUSSION/PLAN:   Renal function continues to improve with IV fluids and discontinuation of metformin/HCTZ  Continue IV fluids today as oral intake remains low  Replace potassium and magnesium IV  Continue to hold metformin and HCTZ  Urine studies prerenal appearing no need for renal ultrasound as she is improved    Monitor BP closely, antihypertensive meds currently on hold  Follow-up a.m. labs  Discussed with family at  bedside.  Palliative care met with family and they are discussing options  IV antibiotics per ID, dosed for GFR around 40      Continue to monitor electrolytes and volume closely, avoid IV contrast and nephrotoxic medications        Sterling Chatterjee MD   [facility]   Office phone number: [phone]  Answering service phone number: [phone]    05/11/23  13:30 EDT    Dictation performed using Dragon dictation software

## 2023-05-11 NOTE — CASE MANAGEMENT/SOCIAL WORK
Discharge Planning Assessment  [facility]     Patient Name: Dayanna Ayala  MRN: [id number]  Today's Date: 5/11/2023    Admit Date: 5/9/2023    Plan: Home with [facility] vs. SNF   Discharge Needs Assessment     Row Name 05/11/23 1312       Living Environment    People in Home alone    Current Living Arrangements home    Potentially Unsafe Housing Conditions none    Primary Care Provided by self    Provides Primary Care For no one    Family Caregiver if Needed child(brayedn), adult    Quality of Family Relationships involved;helpful;supportive       Resource/Environmental Concerns    Resource/Environmental Concerns none       Transition Planning    Patient/Family Anticipates Transition to home with help/services;home with family    Patient/Family Anticipated Services at Transition home health care;skilled nursing    Transportation Anticipated family or friend will provide       Discharge Needs Assessment    Readmission Within the Last 30 Days no previous admission in last 30 days    Current Outpatient/Agency/Support Group homecare agency;skilled nursing facility    Equipment Currently Used at Home rollator;walker, rolling;grab bar;shower chair    Concerns to be Addressed discharge planning;adjustment to diagnosis/illness    Anticipated Changes Related to Illness none    Equipment Needed After Discharge none    Outpatient/Agency/Support Group Needs homecare agency;skilled nursing facility    Discharge Facility/Level of Care Needs home with home health;nursing facility, skilled               Discharge Plan     Row Name 05/11/23 1313       Plan    Plan Home with [facility] vs. SNF    Plan Comments CCP met with patient and patient’s daughter at bedside. CCP role explained and discharge planning discussed. Face sheet verified and IMM noted. Patient’s PCP is Dr. Walker. Patient lives alone, with 4 steps to the entrance of the home. Patient’s bedroom and bathroom are on the main level of the home. Patient has grab bars and  shower chair present in the bathroom. Patient uses a walker for mobility. Prior to admission, patient was independent with her ADLs. Patient is current with [facility] . CCP discussed discharge planning. Discussed SNF vs. home. Patient’s daughter inquired about in home assistance. CCP discussed in home private pay caregivers and provided a list to patient’s daughter. CCP also discussed SNF. Patient’s daughter would like to see if patient will qualify for SNF. CCP provided SNF list for patient’s daughter to review. CCP will follow for PT eval and patient’s clinical course to help determine discharge plans. Nancy HOWE              Continued Care and Services - Admitted Since 5/9/2023     Home Medical Care Coordination complete.    Service Provider Request Status Selected Services Address Phone Fax Patient Preferred    [facility] [address] , [address], [facility] [address] [phone] --                 Demographic Summary     Row Name 05/11/23 1312       General Information    Admission Type inpatient    Arrived From emergency department    Required Notices Provided Important Message from Medicare    Referral Source admission list    Reason for Consult discharge planning    Preferred Language English               Functional Status     Row Name 05/11/23 1312       Functional Status    Usual Activity Tolerance good    Current Activity Tolerance moderate       Functional Status, IADL    Medications assistive equipment    Meal Preparation assistive equipment    Housekeeping assistive equipment    Laundry assistive equipment    Shopping assistive equipment       Mental Status Summary    Recent Changes in Mental Status/Cognitive Functioning no changes               Psychosocial    No documentation.                Abuse/Neglect    No documentation.                Legal    No documentation.                Substance Abuse    No documentation.                 Patient Forms    No documentation.                   CASSIDY BeckhamW

## 2023-05-11 NOTE — CONSULTS
CONSULT NOTE    Infectious Diseases - Costa Walsh MD  [facility]       Patient Identification:  Name: Dayanna Ayala  Age: 89 y.o.  Sex: female  :  10/10/1921  MRN: [id number]             Date of Consultation: 2023      Primary Care Physician: Lucy Walker MD                               Requesting Physician: Dr. Barrera  Reason for Consultation: Infected wound and cellulitis of the left leg and ankle.    Source of information attempted conversation with the patient which is limited because of her being very hard of hearing, assistance from the caring nurse and review of records.    Impression:  Patient is 89-year-old elderly female with history of diabetes hypertension and dyslipidemia and chronic kidney disease presented to the emergency room with worsening pain discomfort and redness and draining wound involving the left foot and leg not responding to out of hospital antibiotic treatment.  Details are sketchy but according the patient she was able to hobble around and move until about 3 weeks ago then started having increasing pain and discomfort in the left ankle and leg and has not been mobile since.  Patient has been dealing with chronic wound involving her both feet with right foot and ankle and leg doing better compared to the left and these wounds come and go on regular basis.  This time 3 weeks ago these wounds started to get worse patient was started on oral antibiotics which she despite taking it for a week was not helping.  Home health nurse evaluated the patient and directed her to the emergency room on 2023.  Patient has been started on vancomycin and Zosyn.  Patient was in acute kidney injury at the time of admission with creatinine of 2.34 which is now down to 1.6.  Blood cultures drawn and urine culture drawn at the time admission so far has been negative.  Patient denies any systemic signs and symptoms of sepsis such as fever chills decrease in appetite.  She is  extremely hard of hearing so I am not sure my attempted review system was reliable.  Despite antibiotic therapy patient has persistent leukocytosis.  This presentation in the above context is consistent with:  1-cellulitis of the left foot and leg in the setting of chronic lower extremity recurrent wound concerning for venous stasis versus ischemic process and likely mixed infection  2-acute on chronic renal failure improved  3-mild leukocytosis unclear whether this reaction to infection or primary marrow process such as CML  4-immobility  5-diabetes mellitus  6-hypertension  7-other diagnoses per primary team.    Recommendations/Discussions:  At this juncture I agree with the care plan consisting of broad-spectrum antibiotic therapy and given the fact that she has significant renal dysfunction in the setting of hypertension and diabetes and given her age would recommend changing the class of antibiotics to cefepime and DC Zosyn while continuing vancomycin with close monitoring of renal function.  Would recommend wound culture as well as vascular studies involving the lower extremities including ankle-brachial index and venous Doppler.  If arterial studies are negative for any peripheral vascular disease then elevation of lower extremity and consideration for Unna boot placement while giving antibiotic therapy as a next stage in her care.  Monitor closely for side effects of antibiotics such as nausea vomiting diarrhea cytopenias renal and hepatic dysfunction rash interaction with other medications selection of resistant pathogens as well as occurrence of yeast and C. difficile infection.  Duration of antibiotic treatment at this stage in her care is open ended.  Thank you Dr. Shea for letting me be the part of your patient care please see above impression and recommendations    History of Present Illness:   Patient is 89-year-old elderly female with history of diabetes hypertension and dyslipidemia and chronic  kidney disease presented to the emergency room with worsening pain discomfort and redness and draining wound involving the left foot and leg not responding to out of hospital antibiotic treatment.  Details are sketchy but according the patient she was able to hobble around and move until about 3 weeks ago then started having increasing pain and discomfort in the left ankle and leg and has not been mobile since.  Patient has been dealing with chronic wound involving her both feet with right foot and ankle and leg doing better compared to the left and these wounds come and go on regular basis.  This time 3 weeks ago these wounds started to get worse patient was started on oral antibiotics which she despite taking it for a week was not helping.  Home health nurse evaluated the patient and directed her to the emergency room on 5/9/2023.  Patient has been started on vancomycin and Zosyn.  Patient was in acute kidney injury at the time of admission with creatinine of 2.34 which is now down to 1.6.  Blood cultures drawn and urine culture drawn at the time admission so far has been negative.  Patient denies any systemic signs and symptoms of sepsis such as fever chills decrease in appetite.  She is extremely hard of hearing so I am not sure my attempted review system was reliable.  Despite antibiotic therapy patient has persistent leukocytosis.      Past Medical History:  Past Medical History:   Diagnosis Date   • Diabetes mellitus    • Hyperlipidemia    • Hypertension      Past Surgical History:  Past Surgical History:   Procedure Laterality Date   • HEMORRHOIDECTOMY     • THROAT SURGERY        Home Meds:  Medications Prior to Admission   Medication Sig Dispense Refill Last Dose   • amLODIPine (NORVASC) 5 MG tablet Take 1 tablet by mouth Daily.      • glimepiride (AMARYL) 1 MG tablet Take 1 tablet by mouth Every Morning Before Breakfast.      • hydrochlorothiazide (HYDRODIURIL) 25 MG tablet Take 1 tablet by mouth Daily.       • metFORMIN (GLUCOPHAGE) 500 MG tablet Take 1 tablet by mouth 2 (Two) Times a Day With Meals.      • simvastatin (ZOCOR) 20 MG tablet Take 1 tablet by mouth Every Night.      • gabapentin (NEURONTIN) 100 MG capsule Take 100 mg by mouth Every Night.        Current Meds:     Current Facility-Administered Medications:   •  aspirin chewable tablet 81 mg, 81 mg, Oral, Daily, Manolo Barrera MD, 81 mg at 05/10/23 2016  •  atorvastatin (LIPITOR) tablet 10 mg, 10 mg, Oral, Nightly, Manolo Barrera MD, 10 mg at 05/10/23 2016  •  castor oil-balsam peru (VENELEX) ointment 1 application, 1 application, Topical, Q12H, Manolo Barrera MD  •  famotidine (PEPCID) injection 20 mg, 20 mg, Intravenous, Q12H, Manolo Barrera MD, 20 mg at 05/10/23 2016  •  gabapentin (NEURONTIN) capsule 100 mg, 100 mg, Oral, Nightly, Manolo Barrera MD, 100 mg at 05/10/23 2016  •  insulin lispro (HUMALOG/ADMELOG) injection 2-7 Units, 2-7 Units, Subcutaneous, 4x Daily With Meals & Nightly, Manolo Barrera MD  •  Menthol-Zinc Oxide 1 application, 1 application, Topical, BID, Manolo Barrera MD, 1 application at 05/10/23 2100  •  morphine injection 2 mg, 2 mg, Intravenous, Q4H PRN, Manolo Barrera MD  •  ondansetron (ZOFRAN) injection 4 mg, 4 mg, Intravenous, Q6H PRN, Manolo Barrera MD  •  Pharmacy to dose vancomycin, , Does not apply, Continuous PRN, Manolo Barrera MD  •  piperacillin-tazobactam (ZOSYN) 3.375 g in iso-osmotic dextrose 50 ml (premix), 3.375 g, Intravenous, Q12H, Manolo Barrera MD, 3.375 g at 05/11/23 0402  •  silver sulfadiazine (SILVADENE, SSD) 1 % cream 1 application, 1 application, Topical, Once, Manolo Barrera MD  •  sodium chloride 0.9 % infusion, 75 mL/hr, Intravenous, Continuous, Manolo Barrera MD, Last Rate: 75 mL/hr at 05/10/23 1818, 75 mL/hr at 05/10/23 1818  •  Vancomycin Pharmacy Intermittent/Pulse Dosing, , Does not apply, Daily, Manolo Barrera MD  Allergies:  No Known Allergies  Social History:   Social History     Tobacco Use   • Smoking  "status: Never   • Smokeless tobacco: Not on file   Substance Use Topics   • Alcohol use: Never      Family History:  History reviewed. No pertinent family history.       Review of Systems  See history of present illness and past medical history.    Limited by significant hearing deficit.      Vitals:   /48 (BP Location: Right arm, Patient Position: Lying)   Pulse 82   Temp 97.6 °F (36.4 °C) (Oral)   Resp 16   Ht 165.1 cm (65\")   Wt 58.1 kg (128 lb)   SpO2 94%   BMI 21.30 kg/m²   I/O:     Intake/Output Summary (Last 24 hours) at 5/11/2023 0619  Last data filed at 5/10/2023 1848  Gross per 24 hour   Intake 1120 ml   Output 400 ml   Net 720 ml     Exam:  Patient is examined using the personal protective equipment as per guidelines from infection control for this particular patient as enacted.  Hand washing was performed before and after patient interaction.  General Appearance:   Hard of hearing nontoxic chronically ill elderly female who does not appear to be in any acute distress unless her left lower extremity is touched.   Head:    Normocephalic, without obvious abnormality, atraumatic   Eyes:    PERRL, conjunctivae/corneas clear, EOM's intact, both eyes   Ears:    Normal external ear canals, both ears   Nose:   Nares normal, septum midline, mucosa normal, no drainage    or sinus tenderness   Throat:   Lips, tongue, gums normal; oral mucosa pink and moist   Neck:   Supple, symmetrical, trachea midline, no adenopathy;     thyroid:  no enlargement/tenderness/nodules; no carotid    bruit or JVD   Back:     Symmetric, no curvature, ROM normal, no CVA tenderness   Lungs:     Clear to auscultation bilaterally, respirations unlabored   Chest Wall:    No tenderness or deformity    Heart:   S1-S2, regular   Abdomen:    Soft nontender   Extremities:   Extremities normal, atraumatic, no cyanosis or edema   Pulses:  Could not palpate pulses in the lower extremity.   Skin:                              Neurologic:   " CNII-XII intact, motor strength grossly intact, sensation grossly intact to light touch, no focal deficits noted       Data Review:    I reviewed the patient's new clinical results.  Results from last 7 days   Lab Units 05/10/23  0615 05/09/23  1628   WBC 10*3/mm3 26.21* 26.12*   HEMOGLOBIN g/dL 13.6 14.6   PLATELETS 10*3/mm3 269 359     Results from last 7 days   Lab Units 05/10/23  0615 05/09/23  1747   SODIUM mmol/L 138 137   POTASSIUM mmol/L 3.6 5.2   CHLORIDE mmol/L 105 103   CO2 mmol/L 23.1 18.0*   BUN mg/dL 54* 78*   CREATININE mg/dL 1.62* 2.34*   CALCIUM mg/dL 8.2 8.4   GLUCOSE mg/dL 90 142*     Microbiology Results (last 10 days)     Procedure Component Value - Date/Time    Eosinophil Smear - Urine, Urine, Clean Catch [[id number]]  (Normal) Collected: 05/10/23 2233    Lab Status: Final result Specimen: Urine, Clean Catch Updated: 05/10/23 2328     Eosinophil Smear 0 % EOS/100 Cells     Urine Culture - Urine, Urine, Catheter [[id number]]  (Normal) Collected: 05/09/23 1933    Lab Status: Final result Specimen: Urine, Catheter Updated: 05/10/23 2040     Urine Culture No growth    Blood Culture - Blood, Arm, Right [[id number]]  (Normal) Collected: 05/09/23 1706    Lab Status: Preliminary result Specimen: Blood from Arm, Right Updated: 05/10/23 1715     Blood Culture No growth at 24 hours    Blood Culture - Blood, Arm, Left [[id number]]  (Normal) Collected: 05/09/23 1646    Lab Status: Preliminary result Specimen: Blood from Arm, Left Updated: 05/10/23 1700     Blood Culture No growth at 24 hours            Assessment:  Active Hospital Problems    Diagnosis  POA   • **Cellulitis of left lower extremity [L03.116]  Yes      Resolved Hospital Problems   No resolved problems to display.         Plan:  See above  Costa Graff MD   5/11/2023  06:19 EDT    Parts of this note may be an electronic transcription/translation of spoken language to printed text using the Dragon dictation system.

## 2023-05-11 NOTE — PLAN OF CARE
Goal Outcome Evaluation:           Progress: no change  Outcome Evaluation: 89 y/o transfer from observation unit for cellulitis LLE, FLAKO and UTI. Pt confused this shift, AOX2 (not situation or time). Pt attempting to get OOB this evening required redirection from family to remain in bed. Low air loss matress delivered, awaiting transfer. Per report, dsg changed this shift. Stage 2 PI ordered to be changed BID. PIV running Nacl at 75cc/hr. Currently running K phosphate for 6 hours. Vancomyocin to start later tonight. Voiding PW. Pain managed via tylenol. Has not been OOb this shift. Glucose monitored. Will CTM.

## 2023-05-11 NOTE — CONSULTS
"Purpose of the visit was to evaluate for: goals of care/advanced care planning, support for patient/family, hospice referral/discussion, pain/symptom management, withdrawal of interventions, transfer to comfort care bed/unit and comfort care. Spoke with MD and RN as well as family and discussed goals of care, care options, resuscitation status and discharge options.      Assessment:  Patient is palliative care appropriate given: advanced age, infection with encephalopathy, FLAKO on CKD, DM with neuropathy, general debility.  Upon assessment patient is alert and extremely [place] but mildly impulsive, agitated and confused to place time and situation. Nonverbal s/s pain evident.  PPS: 50%    Recommendations/Plan: Continue to provide support for patient and family.    Other Comments: I met with this patient's daughter Theresa at bedside to offer support and to discuss her goals of care for the patient. She is visibly distraught and shares that she doesn't understand/isn't prepared for the mental status change she is seeing in the patient. She shares that normally the patient is alert and oriented, is \"sharp as a tack\" and \"not your average 89 year old\". She notes that the patient lives independently with some assistance, but notes a decline in functional ability since the onset of her leg wounds a few weeks ago. We discussed some likely causes of the patient's sudden onset delirium as well as the treatment options and plan of care in place to improve her cognition. Theresa states that she and her mother never discussed healthcare tretament options or end-of-life care planning, nor does she think she completed a living will for healthcare decisions. Her goal at this time is to continue to provide supportive care with the expectation of recovery to baseline. Dr. Barrera notes that the patient is having a positive response to the antibiotics and recommends that we continue to evaluate for progress before we discuss future planning " needs.   I advised Theresa of our availability and all questions answered.    Palliative Care will continue to follow to provide support with decision making.    Mary Dillard RN

## 2023-05-11 NOTE — DISCHARGE PLACEMENT REQUEST
"Mayra Ayala (89 y.o. Female)     Date of Birth   10/10/1921    Social Security Number       Address   [address] [address]    Home Phone   [phone]    MRN   [id number]       Lutheran   Latter day    Marital Status                               Admission Date   5/9/23    Admission Type   Emergency    Admitting Provider   Manolo Barrera MD    Attending Provider   Manolo Barrera MD    Department, Room/Bed   [facility] OBSERVATION, 129/1       Discharge Date       Discharge Disposition       Discharge Destination                               Attending Provider: Manolo Barrera MD    Allergies: No Known Allergies    Isolation: None   Infection: None   Code Status: No CPR    Ht: 165.1 cm (65\")   Wt: 58.1 kg (128 lb)    Admission Cmt: None   Principal Problem: Cellulitis of left lower extremity [L03.116]                 Active Insurance as of 5/9/2023     Primary Coverage     Payor Plan Insurance Group Employer/Plan Group    MEDICARE MEDICARE A & B      Payor Plan Address Payor Plan Phone Number Payor Plan Fax Number Effective Dates    [address] [phone]  10/1/1986 - None Entered    [facility] [id number]       Subscriber Name Subscriber Birth Date Member ID       MAYRA AYALA 10/10/1921 [id number]           Secondary Coverage     Payor Plan Insurance Group Employer/Plan Group    [place] [id number]     Payor Plan Address Payor Plan Phone Number Payor Plan Fax Number Effective Dates    [address]   10/2/1990 - None Entered    [facility] [id number]       Subscriber Name Subscriber Birth Date Member ID       MAYRA AYALA 10/10/1921 [id number]                 Emergency Contacts      (Rel.) Home Phone Work Phone Mobile Phone    Theresa oRsen (Daughter) -- -- [phone]              "

## 2023-05-11 NOTE — PROGRESS NOTES
"[facility]: Vancomycin Initial Consult Note    Dayanna Ayala is a 89 y.o. female who is on day 1 of pharmacy to dose vancomycin.    Indication: Sepsis  Consulting Provider: Dr. Barrera  Planned Duration of Therapy: 5 days  Loading Dose Ordered or Given: 1000 mg on 5/9 at 1752  MRSA PCR performed: NO  Culture/Source:   5/9 BCx NG  5/9 UCx NG   Target: Dose by Levels  Pertinent Vanc Dosing History:   Other Antimicrobials: Cefepime    Vitals/Labs  Ht: 165.1 cm (65\"); Wt: 58.1 kg (128 lb)  Temp Readings from Last 1 Encounters:   05/11/23 97.7 °F (36.5 °C) (Oral)    Estimated Creatinine Clearance: 23.5 mL/min (A) (by C-G formula based on SCr of 1.14 mg/dL (H)).        Results from last 7 days   Lab Units 05/11/23  0735 05/10/23  0615 05/09/23  1747 05/09/23  1628   CREATININE mg/dL 1.14* 1.62* 2.34*  --    WBC 10*3/mm3 16.69* 26.21*  --  26.12*     Assessment/Plan:    Vancomycin Dose: SCr improves, random vanc level <10 x 2 days, will schedule 750mg q24h to give a predicted AUC of 560.   Vanc Trough has been ordered for 5/13 @ 1730    Pharmacy will follow patient's kidney function and will adjust doses and obtain levels as necessary. Thank you for involving pharmacy in this patient's care. Please contact pharmacy with any questions or concerns.                           Kylie Bates, [facility]  Clinical Pharmacist       "

## 2023-05-11 NOTE — PROGRESS NOTES
"Daily progress note    Primary care physician  Dr. Walker    Chief complaint  Doing same with no new complaints and remained pleasantly confused and family at bedside.    History of present illness  89-year-old white female with history of diabetes hypertension hyperlipidemia and neuropathy brought to the emergency room by the family with left foot swelling redness pain going on for last few days to week with no improvement with oral antibiotics.  Patient denies any fever chills chest pain shortness of breath abdominal pain nausea vomiting diarrhea.  Patient work-up in ER revealed acute kidney injury and also cellulitis with sepsis admit for management.  Patient is DNR per her wishes.      REVIEW OF SYSTEMS  All systems reviewed and negative except for those discussed in HPI.     PHYSICAL EXAM  Blood pressure 128/57, pulse 83, temperature 97.9 °F (36.6 °C), temperature source Oral, resp. rate 18, height 165.1 cm (65\"), weight 58.1 kg (128 lb), SpO2 94 %.    GENERAL:  Awake and alert in no distress   HEENT:  Unremarkable  NECK:  Supple  CV: regular rhythm, normal rate  RESPIRATORY: normal effort and moving air bilaterally  ABDOMEN: soft, NTND: Bowel sounds positive  MUSCULOSKELETAL: no deformity  NEURO: alert and oriented x1 with a nonfocal neuro exam  PSYCH:  calm, cooperative  SKIN: The patient's left leg was wrapped with ABD pads.       LAB RESULTS  Lab Results (last 24 hours)     Procedure Component Value Units Date/Time    MRSA Screen, PCR (Inpatient) - Swab, Nares [[id number]]  (Normal) Collected: 05/11/23 1058    Specimen: Swab from Nares Updated: 05/11/23 1223     MRSA PCR No MRSA Detected    Narrative:      The negative predictive value of this diagnostic test is high and should only be used to consider de-escalating anti-MRSA therapy. A positive result may indicate colonization with MRSA and must be correlated clinically.    POC Glucose Once [[id number]]  (Abnormal) Collected: 05/11/23 1122    Specimen: " Blood Updated: 05/11/23 1124     Glucose 131 mg/dL      Comment: Meter: [id number] : [id number] Tan MARTINEZ       TSH [[id number]]  (Normal) Collected: 05/11/23 0735    Specimen: Blood Updated: 05/11/23 0812     TSH 0.708 uIU/mL     Renal Function Panel [[id number]]  (Abnormal) Collected: 05/11/23 0735    Specimen: Blood Updated: 05/11/23 0809     Glucose 83 mg/dL      BUN 33 mg/dL      Creatinine 1.14 mg/dL      Sodium 144 mmol/L      Potassium 3.3 mmol/L      Chloride 112 mmol/L      CO2 22.7 mmol/L      Calcium 8.2 mg/dL      Albumin 2.7 g/dL      Phosphorus 2.1 mg/dL      Anion Gap 9.3 mmol/L      BUN/Creatinine Ratio 28.9     eGFR 42.8 mL/min/1.73     Narrative:      GFR Normal >60  Chronic Kidney Disease <60  Kidney Failure <15    The GFR formula is only valid for adults with stable renal function between ages 18 and 70.    Vancomycin, Random [[id number]]  (Normal) Collected: 05/11/23 0735    Specimen: Blood Updated: 05/11/23 0808     Vancomycin Random 8.40 mcg/mL     Narrative:      Therapeutic Ranges for Vancomycin    Vancomycin Random   5.0-40.0 mcg/mL  Vancomycin Trough   5.0-20.0 mcg/mL  Vancomycin Peak     20.0-40.0 mcg/mL    Lipid Panel [[id number]] Collected: 05/11/23 0735    Specimen: Blood Updated: 05/11/23 0808     Total Cholesterol 99 mg/dL      Triglycerides 120 mg/dL      HDL Cholesterol 46 mg/dL      LDL Cholesterol  32 mg/dL      VLDL Cholesterol 21 mg/dL      LDL/HDL Ratio 0.63    Narrative:      Cholesterol Reference Ranges  (U.S. Department of Health and Human Services ATP III Classifications)    Desirable          <200 mg/dL  Borderline High    200-239 mg/dL  High Risk          >240 mg/dL      Triglyceride Reference Ranges  (U.S. Department of Health and Human Services ATP III Classifications)    Normal           <150 mg/dL  Borderline High  150-199 mg/dL  High             200-499 mg/dL  Very High        >500 mg/dL    HDL Reference Ranges  (U.S. Department of Health and Human  Services ATP III Classifications)    Low     <40 mg/dl (major risk factor for CHD)  High    >60 mg/dl ('negative' risk factor for CHD)        LDL Reference Ranges  (U.S. Department of Health and Human Services ATP III Classifications)    Optimal          <100 mg/dL  Near Optimal     100-129 mg/dL  Borderline High  130-159 mg/dL  High             160-189 mg/dL  Very High        >189 mg/dL    Uric Acid [[id number]]  (Normal) Collected: 05/11/23 0735    Specimen: Blood Updated: 05/11/23 0808     Uric Acid 5.7 mg/dL     Hemoglobin A1c [[id number]]  (Abnormal) Collected: 05/11/23 0735    Specimen: Blood Updated: 05/11/23 0801     Hemoglobin A1C 6.20 %     Narrative:      Hemoglobin A1C Ranges:    Increased Risk for Diabetes  5.7% to 6.4%  Diabetes                     >= 6.5%  Diabetic Goal                < 7.0%    CBC & Differential [[id number]]  (Abnormal) Collected: 05/11/23 0735    Specimen: Blood Updated: 05/11/23 0749    Narrative:      The following orders were created for panel order CBC & Differential.  Procedure                               Abnormality         Status                     ---------                               -----------         ------                     CBC Auto Differential[[id number]]        Abnormal            Final result                 Please view results for these tests on the individual orders.    CBC Auto Differential [[id number]]  (Abnormal) Collected: 05/11/23 0735    Specimen: Blood Updated: 05/11/23 0749     WBC 16.69 10*3/mm3      RBC 4.67 10*6/mm3      Hemoglobin 13.8 g/dL      Hematocrit 44.9 %      MCV 96.1 fL      MCH 29.6 pg      MCHC 30.7 g/dL      RDW 13.5 %      RDW-SD 48.1 fl      MPV 9.9 fL      Platelets 240 10*3/mm3      Neutrophil % 82.8 %      Lymphocyte % 8.9 %      Monocyte % 5.7 %      Eosinophil % 1.1 %      Basophil % 0.4 %      Immature Grans % 1.1 %      Neutrophils, Absolute 13.83 10*3/mm3      Lymphocytes, Absolute 1.48 10*3/mm3      Monocytes, Absolute 0.95  10*3/mm3      Eosinophils, Absolute 0.18 10*3/mm3      Basophils, Absolute 0.07 10*3/mm3      Immature Grans, Absolute 0.18 10*3/mm3      nRBC 0.0 /100 WBC     POC Glucose Once [[id number]]  (Normal) Collected: 05/11/23 0601    Specimen: Blood Updated: 05/11/23 0612     Glucose 73 mg/dL      Comment: Meter: [id number] : [id number] Lobostas MARTINEZ       Sodium, Urine, Random - Urine, Clean Catch [[id number]] Collected: 05/10/23 2233    Specimen: Urine, Clean Catch Updated: 05/10/23 2357     Sodium, Urine 24 mmol/L     Narrative:      Reference intervals for random urine have not been established.  Clinical usage is dependent upon physician's interpretation in combination with other laboratory tests.       Protein, Urine, Random - Urine, Clean Catch [[id number]] Collected: 05/10/23 2233    Specimen: Urine, Clean Catch Updated: 05/10/23 2356     Total Protein, Urine 14.4 mg/dL     Narrative:      Reference intervals for random urine have not been established.  Clinical usage is dependent upon physician's interpretation in combination with other laboratory tests.       Creatinine Urine Random (kidney function) GFR component - Urine, Clean Catch [[id number]] Collected: 05/10/23 2233    Specimen: Urine, Clean Catch Updated: 05/10/23 2356     Creatinine, Urine 85.3 mg/dL     Narrative:      Reference intervals for random urine have not been established.  Clinical usage is dependent upon physician's interpretation in combination with other laboratory tests.       Eosinophil Smear - Urine, Urine, Clean Catch [[id number]]  (Normal) Collected: 05/10/23 2233    Specimen: Urine, Clean Catch Updated: 05/10/23 2328     Eosinophil Smear 0 % EOS/100 Cells     Urine Culture - Urine, Urine, Catheter [[id number]]  (Normal) Collected: 05/09/23 1933    Specimen: Urine, Catheter Updated: 05/10/23 2040     Urine Culture No growth    POC Glucose Once [[id number]]  (Normal) Collected: 05/10/23 2028    Specimen: Blood Updated: 05/10/23 2037      Glucose 90 mg/dL      Comment: Meter: [id number] : [id number] Yamil MARTINEZ       Blood Culture - Blood, Arm, Right [[id number]]  (Normal) Collected: 05/09/23 1706    Specimen: Blood from Arm, Right Updated: 05/10/23 1715     Blood Culture No growth at 24 hours    Blood Culture - Blood, Arm, Left [[id number]]  (Normal) Collected: 05/09/23 1646    Specimen: Blood from Arm, Left Updated: 05/10/23 1700     Blood Culture No growth at 24 hours    CK [[id number]]  (Normal) Collected: 05/10/23 1436    Specimen: Blood Updated: 05/10/23 1629     Creatine Kinase 58 U/L     POC Glucose Once [[id number]]  (Normal) Collected: 05/10/23 1617    Specimen: Blood Updated: 05/10/23 1619     Glucose 94 mg/dL      Comment: Meter: [id number] : [id number] Chico Rosseccadeandra MARTINEZ       BNP [[id number]]  (Abnormal) Collected: 05/10/23 1436    Specimen: Blood Updated: 05/10/23 1538     proBNP 4,270.0 pg/mL     Narrative:      Among patients with dyspnea, NT-proBNP is highly sensitive for the detection of acute congestive heart failure. In addition NT-proBNP of <300 pg/ml effectively rules out acute congestive heart failure with 99% negative predictive value.    Results may be falsely decreased if patient taking Biotin.          Imaging Results (Last 24 Hours)     ** No results found for the last 24 hours. **          Current Facility-Administered Medications:   •  acetaminophen (TYLENOL) tablet 650 mg, 650 mg, Oral, Q4H PRN, Manolo Barrera MD, 650 mg at 05/11/23 1206  •  aspirin chewable tablet 81 mg, 81 mg, Oral, Daily, Manolo Barrera MD, 81 mg at 05/11/23 0904  •  atorvastatin (LIPITOR) tablet 10 mg, 10 mg, Oral, Nightly, Manolo Barrera MD, 10 mg at 05/10/23 2016  •  castor oil-balsam peru (VENELEX) ointment 1 application, 1 application, Topical, Q12H, Manolo Barrera MD, 1 application at 05/11/23 0904  •  [START ON 5/12/2023] cefepime 1 gm IVPB in 100 mL NS (VTB), 1 g, Intravenous, Q24H, Costa Graff MD  •  famotidine (PEPCID) tablet 20 mg,  20 mg, Oral, Daily, Sterling Chatterjee MD, 20 mg at 05/11/23 0904  •  gabapentin (NEURONTIN) capsule 100 mg, 100 mg, Oral, Nightly, Antwan Barrera MD, 100 mg at 05/10/23 2016  •  insulin lispro (HUMALOG/ADMELOG) injection 2-7 Units, 2-7 Units, Subcutaneous, 4x Daily With Meals & Nightly, Antwan Barrera MD  •  Menthol-Zinc Oxide 1 application, 1 application, Topical, BID, Antwan Barrera MD, 1 application at 05/11/23 0905  •  morphine injection 2 mg, 2 mg, Intravenous, Q4H PRN, Antwan Barrera MD  •  ondansetron (ZOFRAN) injection 4 mg, 4 mg, Intravenous, Q6H PRN, Antwan Barrera MD  •  Pharmacy to dose vancomycin, , Does not apply, Continuous PRN, Antwan Barrera MD  •  potassium phosphate 30 mmol in sodium chloride 0.9 % 500 mL infusion, 30 mmol, Intravenous, Once, Sterling Chatterjee MD  •  silver sulfadiazine (SILVADENE, SSD) 1 % cream 1 application, 1 application, Topical, Once, Antwan Barrera MD  •  sodium chloride 0.9 % infusion, 75 mL/hr, Intravenous, Continuous, Antwan Barrera MD, Last Rate: 75 mL/hr at 05/11/23 1051, 75 mL/hr at 05/11/23 1051  •  vancomycin 750 mg in sodium chloride 0.9 % 250 mL IVPB-VTB, 750 mg, Intravenous, Q24H, Sterling Chatterjee MD     ASSESSMENT  Left lower extremity cellulitis with sepsis  Acute kidney injury  Diabetes mellitus   Hypertension  Hyperlipidemia  Neuropathy    PLAN  CPM  IVF  Continue IV antibiotics   Infectious disease consult appreciated  Nephrology to follow patient  Discontinue HCTZ Glucophage and Norvasc  Stress ulcer DVT prophylaxis  Accu-Chek with sliding scale insulin  Supportive care  DNR   PT/OT  Discussed with nursing staff and family  Follow closely further recommendation current hospital course    ANTWAN BARRERA MD    Copied text in this note has been reviewed and is accurate as of 05/11/23

## 2023-05-12 NOTE — THERAPY EVALUATION
Acute Care - Speech Language Pathology   Swallow Initial Evaluation [facility]     Patient Name: Dayanna Ayala  : 10/10/1921  MRN: [id number]  Today's Date: 2023               Admit Date: 2023    Visit Dx:     ICD-10-CM ICD-9-CM   1. Cellulitis of left lower extremity  L03.116 682.6   2. Leukocytosis, unspecified type  D72.829 288.60   3. Acute kidney injury  N17.9 584.9     Patient Active Problem List   Diagnosis   • Cellulitis of left lower extremity     Past Medical History:   Diagnosis Date   • Diabetes mellitus    • Hyperlipidemia    • Hypertension      Past Surgical History:   Procedure Laterality Date   • HEMORRHOIDECTOMY     • THROAT SURGERY         SLP Recommendation and Plan  SLP Swallowing Diagnosis: oral dysphagia, suspected pharyngeal dysphagia (23)  SLP Diet Recommendation: NPO, temporary alternate methods of nutrition/hydration (23)     SLP Rec. for Method of Medication Administration: meds via alternate route (23)     Monitor for Signs of Aspiration: yes, notify SLP if any concerns (23)  Recommended Diagnostics: reassess via clinical swallow evaluation (23)  Swallow Criteria for Skilled Therapeutic Interventions Met: demonstrates skilled criteria (23)  Anticipated Discharge Disposition (SLP): unknown (23)  Rehab Potential/Prognosis, Swallowing: good, to achieve stated therapy goals (23)  Therapy Frequency (Swallow): PRN (23)  Predicted Duration Therapy Intervention (Days): until discharge (23)                                        Plan of Care Reviewed With: patient, daughter  Outcome Evaluation: Orders received due to RN reports of excessive coughing with morning medications whole with puree. Daughter at bedside. Patient with suspected agitation as persistent fidgeting and reaching noted during assessment. No meaningful verbalizations, however, grunting vocalizations observed.  Daughter at bedside reports significant decline as pt was talking and consumed dinner previous date. Clinician attempted ice chip and puree trials this date, however no labial closure appreciated from spoon. Clinician provided ice chip and puree stimulation to bottom lip resulting in pt closing mouth and initiating a swallow. No overt s/s of aspiration demonstrated with very trace amounts of melted ice and puree. Pt is not appropriate for a diet at this time due altered mental status. Recommend NPO and consider temporary alternate means of nutrition. Meds via alternate route. Speech to follow for re-eval when pt appropriate for PO trials.      SWALLOW EVALUATION (last 72 hours)     SLP Adult Swallow Evaluation     Row Name 05/12/23 1100                   Rehab Evaluation    Document Type evaluation  -CR        Subjective Information no complaints  -CR        Patient Observations alert;cooperative  -CR        Patient Effort fair  -CR        Symptoms Noted During/After Treatment none  -CR           General Information    Patient Profile Reviewed yes  -CR        Pertinent History Of Current Problem 89 y/o female admitted due to swelling of L foot. Patient was living home alone prior to admission. RN reports this AM pt exhibited excessive coughing during administration of meds whole with puree.  -CR        Current Method of Nutrition regular textures;thin liquids  -CR        Precautions/Limitations, Vision difficult to assess  -CR        Precautions/Limitations, Hearing difficult to assess  -CR        Prior Level of Function-Communication WFL  -CR        Prior Level of Function-Swallowing no diet consistency restrictions;dietary restrictions (e.g. consistent carb, low salt, etc.)  -CR        Plans/Goals Discussed with patient and family;agreed upon  -CR        Barriers to Rehab medically complex  -CR           Pain    Additional Documentation Pain Scale: FACES Pre/Post-Treatment (Group)  -CR           Pain Scale: FACES  Pre/Post-Treatment    Pain: FACES Scale, Pretreatment 4-->hurts little more  -CR        Posttreatment Pain Rating 4-->hurts little more  -CR        Pre/Posttreatment Pain Comment No intelligible verbalizations this date, however, patient with agitation and fidgeting throughout assessment.  -CR           Oral Motor Structure and Function    Dentition Assessment natural, present and adequate;missing teeth  -CR        Secretion Management WNL/WFL  -CR        Mucosal Quality dry  -CR           Oral Musculature and Cranial Nerve Assessment    Oral Motor General Assessment unable to assess;other (see comments)  Pt unable to follow commands  -CR           Clinical Swallow Eval    Clinical Swallow Evaluation Summary Orders received due to RN reports of excessive coughing with morning medications whole with puree. Daughter at bedside. Patient with suspected agitation as persistent fidgeting and reaching noted during assessment. No meaningful verbalizations, however, grunting vocalizations observed. Daughter at bedside reports significant decline as pt was talking and consumed dinner previous date. Clinician attempted ice chip and puree trials this date. Pt with no labial closure from spoon. Clinician provided ice chip and puree to bottom lip resulting in pt closing mouth and initiating a swallow. No overt s/s of aspiration demonstrated with very trace amounts of liquid and puree. Pt is not appropriate for a diet at this time due altered mental status. Recommend NPO and consider temporary alternate means of nutrition. Meds via alternate route. Speech to follow for re-eval when pt appropriate for PO trials.  -CR           SLP Evaluation Clinical Impression    SLP Swallowing Diagnosis oral dysphagia;suspected pharyngeal dysphagia  -CR        Functional Impact risk of aspiration/pneumonia  -CR        Rehab Potential/Prognosis, Swallowing good, to achieve stated therapy goals  -CR        Swallow Criteria for Skilled Therapeutic  Interventions Met demonstrates skilled criteria  -CR           Recommendations    Therapy Frequency (Swallow) PRN  -CR        Predicted Duration Therapy Intervention (Days) until discharge  -CR        SLP Diet Recommendation NPO;temporary alternate methods of nutrition/hydration  -CR        Recommended Diagnostics reassess via clinical swallow evaluation  -CR        Oral Care Recommendations Oral Care BID/PRN  -CR        SLP Rec. for Method of Medication Administration meds via alternate route  -CR        Monitor for Signs of Aspiration yes;notify SLP if any concerns  -CR        Anticipated Discharge Disposition (SLP) unknown  -CR           Swallow Goals (SLP)    Swallow LTGs Patient will demonstrate functional swallow for  -CR           (LTG) Patient will demonstrate functional swallow for    Diet Texture (Demonstrate functional swallow) pureed textures  -CR        Liquid viscosity (Demonstrate functional swallow) thin liquids  -CR        [place] (Demonstrate functional swallow) independently (over 90% accuracy)  -CR        Time Frame (Demonstrate functional swallow) by discharge  -CR              User Key  (r) = Recorded By, (t) = Taken By, (c) = Cosigned By    Initials Name Effective Dates    CR Ne Farrell CF-SLP 11/10/22 -                 EDUCATION  The patient has been educated in the following areas:   Dysphagia (Swallowing Impairment).        SLP GOALS     Row Name 05/12/23 1100             (LTG) Patient will demonstrate functional swallow for    Diet Texture (Demonstrate functional swallow) pureed textures  -CR      Liquid viscosity (Demonstrate functional swallow) thin liquids  -CR      [place] (Demonstrate functional swallow) independently (over 90% accuracy)  -CR      Time Frame (Demonstrate functional swallow) by discharge  -CR            User Key  (r) = Recorded By, (t) = Taken By, (c) = Cosigned By    Initials Name Provider Type    Ne Quiroga CF-SLP Speech and Language  Pathologist                   Time Calculation:    Time Calculation- SLP     Row Name 05/12/23 1136             Time Calculation- SLP    SLP Start Time 1100  -CR      SLP Received On 05/12/23  -EMILIANO            User Key  (r) = Recorded By, (t) = Taken By, (c) = Cosigned By    Initials Name Provider Type    Ne Quiroga CF-SLP Speech and Language Pathologist                Therapy Charges for Today     Code Description Service Date Service Provider Modifiers Qty    [id number] HC ST EVAL ORAL PHARYNG SWALLOW 2 5/12/2023 Ne Farrell CF-SLP GN 1               JOSUE Reyes  5/12/2023

## 2023-05-12 NOTE — PLAN OF CARE
Problem: Malnutrition  Goal: Improved Nutritional Intake  Outcome: Adequate for Care Transition   Goal Outcome Evaluation:     Pt with poor oral intake, weight loss and large wounds on BLE. Transitioned to palliative/comfort measures today. Encourage PO intake only if alert/safe to do so. RINKU.

## 2023-05-12 NOTE — NURSING NOTE
Patient took half a bite of pudding from this RN and began coughing. Suction was placed at bedside and RN immediately stopped feeding patient. Speech consult placed.

## 2023-05-12 NOTE — THERAPY EVALUATION
Patient Name: Dayanna Ayala  : 10/10/1921    MRN: [id number]                              Today's Date: 2023       Admit Date: 2023    Visit Dx:     ICD-10-CM ICD-9-CM   1. Cellulitis of left lower extremity  L03.116 682.6   2. Leukocytosis, unspecified type  D72.829 288.60   3. Acute kidney injury  N17.9 584.9     Patient Active Problem List   Diagnosis   • Cellulitis of left lower extremity     Past Medical History:   Diagnosis Date   • Diabetes mellitus    • Hyperlipidemia    • Hypertension      Past Surgical History:   Procedure Laterality Date   • HEMORRHOIDECTOMY     • THROAT SURGERY        General Information     Row Name 23 1000          Physical Therapy Time and Intention    Document Type evaluation  -AR     Mode of Treatment physical therapy  -AR     Row Name 23 1000          General Information    Patient Profile Reviewed yes  -AR     Prior Level of Function min assist:  lives alone, pt cooks, uses walker; dtr assists w/ groceries/errands, bathes alone  -AR     Existing Precautions/Restrictions fall  -AR     Barriers to Rehab none identified  -AR     Row Name 23 1000          Living Environment    People in Home alone  -AR     Row Name 23 1000          Home Main Entrance    Number of Stairs, Main Entrance four  -AR     Stair Railings, Main Entrance railings safe and in good condition  -AR     Row Name 23 1000          Cognition    Orientation Status (Cognition) disoriented to;person;place;situation;unable/difficult to assess  -AR     Row Name 23 1000          Safety Issues, Functional Mobility    Impairments Affecting Function (Mobility) balance;cognition;strength;pain;postural/trunk control;endurance/activity tolerance  -AR           User Key  (r) = Recorded By, (t) = Taken By, (c) = Cosigned By    Initials Name Provider Type    AR Ese Schmitt PT Physical Therapist               Mobility     Row Name 23 1001          Bed Mobility    Bed Mobility  supine-sit;sit-supine  -AR     Supine-Sit [place] (Bed Mobility) dependent (less than 25% patient effort);2 person assist  -AR     Sit-Supine [place] (Bed Mobility) dependent (less than 25% patient effort);2 person assist  -AR     Assistive Device (Bed Mobility) head of bed elevated;draw sheet;bed rails  -AR     Comment, (Bed Mobility) not following instructions, provided assist.  mod A for static sitting balance; pt quickly trying to lie back down/resisting support  -AR     Row Name 05/12/23 1001          Transfers    Comment, (Transfers) not appropriate to attempt  -AR           User Key  (r) = Recorded By, (t) = Taken By, (c) = Cosigned By    Initials Name Provider Type    Ese Vergara, PT Physical Therapist               Obj/Interventions     Row Name 05/12/23 1002          Range of Motion Comprehensive    Comment, General Range of Motion B LE WFL  -AR     Row Name 05/12/23 1002          Strength Comprehensive (MMT)    Comment, General Manual Muscle Testing (MMT) Assessment B LE w/ generalized weakness; difficulty following instrructions for MMT or mobility  -AR     Row Name 05/12/23 1002          Motor Skills    Therapeutic Exercise --  unable to follow instructions  -AR     Row Name 05/12/23 1002          Balance    Balance Assessment sitting static balance  -AR     Static Sitting Balance moderate assist;maximum assist  -AR           User Key  (r) = Recorded By, (t) = Taken By, (c) = Cosigned By    Initials Name Provider Type    Ese Vergara, PT Physical Therapist               Goals/Plan     Row Name 05/12/23 1006          Bed Mobility Goal 1 (PT)    Activity/Assistive Device (Bed Mobility Goal 1, PT) bed mobility activities, all  -AR     [place] Level/Cues Needed (Bed Mobility Goal 1, PT) minimum assist (75% or more patient effort)  -AR     Time Frame (Bed Mobility Goal 1, PT) 1 week  -AR     Row Name 05/12/23 1006          Transfer Goal 1 (PT)    Activity/Assistive Device  (Transfer Goal 1, PT) sit-to-stand/stand-to-sit;bed-to-chair/chair-to-bed;walker, rolling  -AR     [place] Level/Cues Needed (Transfer Goal 1, PT) minimum assist (75% or more patient effort)  -AR     Time Frame (Transfer Goal 1, PT) 1 week  -AR     Row Name 05/12/23 1006          Gait Training Goal 1 (PT)    Activity/Assistive Device (Gait Training Goal 1, PT) gait (walking locomotion);walker, rolling  -AR     [place] Level (Gait Training Goal 1, PT) minimum assist (75% or more patient effort)  -AR     Distance (Gait Training Goal 1, PT) 5  -AR     Time Frame (Gait Training Goal 1, PT) 1 week  -AR     Row Name 05/12/23 1006          Therapy Assessment/Plan (PT)    Planned Therapy Interventions (PT) balance training;bed mobility training;gait training;home exercise program;patient/family education;transfer training;ROM (range of motion);stair training;strengthening  -AR           User Key  (r) = Recorded By, (t) = Taken By, (c) = Cosigned By    Initials Name Provider Type    AR Ese Schmitt, PT Physical Therapist               Clinical Impression     Row Name 05/12/23 1002          Pain    Pretreatment Pain Rating 5/10  -AR     Posttreatment Pain Rating 5/10  -AR     Pain Location - Side/Orientation Left  -AR     Pain Location lower  -AR     Pain Location - extremity  -AR     Pre/Posttreatment Pain Comment unable to provide number, indicates moderate pain during PT  -AR     Pain Intervention(s) Repositioned  -AR     Row Name 05/12/23 1002          Plan of Care Review    Plan of Care Reviewed With patient;daughter  -AR     Outcome Evaluation Pt admitted from home with +LLE cellulitis, +sepsis and FLAKO.  Pt confused, not following instructions; dtr reports she hasn't slept since Tuesday.  Dtr reports pt normally lives alone, uses cane, bathes and cooks herself; dtr assists w/ groceries/errands.  Today pt not following instructions, provided assist for mobility; required dependent assist x2 for supine<>sit;   mod A for static sitting balance; pt quickly trying to lie back down/resisting support.  Recommend DC to SNU.  -AR     Row Name 05/12/23 1002          Therapy Assessment/Plan (PT)    Rehab Potential (PT) fair, will monitor progress closely  -AR     Criteria for Skilled Interventions Met (PT) yes  -AR     Therapy Frequency (PT) 6 times/wk  -AR     Row Name 05/12/23 1002          Vital Signs    O2 Delivery Pre Treatment room air  -AR     Row Name 05/12/23 1002          Positioning and Restraints    Pre-Treatment Position in bed  -AR     Post Treatment Position bed  -AR     In Bed notified nsg;supine;call light within reach;encouraged to call for assist;exit alarm on;with family/caregiver  -AR           User Key  (r) = Recorded By, (t) = Taken By, (c) = Cosigned By    Initials Name Provider Type    Ese Vergara, PT Physical Therapist               Outcome Measures     Row Name 05/12/23 1008          How much help from another person do you currently need...    Turning from your back to your side while in flat bed without using bedrails? 2  -AR     Moving from lying on back to sitting on the side of a flat bed without bedrails? 1  -AR     Moving to and from a bed to a chair (including a wheelchair)? 1  -AR     Standing up from a chair using your arms (e.g., wheelchair, bedside chair)? 1  -AR     Climbing 3-5 steps with a railing? 1  -AR     To walk in hospital room? 1  -AR     AM-PAC 6 Clicks Score (PT) 7  -AR     Highest level of mobility 2 --> Bed activities/dependent transfer  -AR     Row Name 05/12/23 1008          Functional Assessment    Outcome Measure Options AM-PAC 6 Clicks Basic Mobility (PT)  -AR           User Key  (r) = Recorded By, (t) = Taken By, (c) = Cosigned By    Initials Name Provider Type    Ese Vergara PT Physical Therapist                             Physical Therapy Education     Title: PT OT SLP Therapies (In Progress)     Topic: Physical Therapy (In Progress)     Point:  Mobility training (In Progress)     Learning Progress Summary           Patient Acceptance, E, NR by AR at 5/12/2023 1009   Family Acceptance, E, NR by AR at 5/12/2023 1009                   Point: Home exercise program (In Progress)     Learning Progress Summary           Patient Acceptance, E, NR by AR at 5/12/2023 1009   Family Acceptance, E, NR by AR at 5/12/2023 1009                   Point: Body mechanics (In Progress)     Learning Progress Summary           Patient Acceptance, E, NR by AR at 5/12/2023 1009   Family Acceptance, E, NR by AR at 5/12/2023 1009                   Point: Precautions (In Progress)     Learning Progress Summary           Patient Acceptance, E, NR by AR at 5/12/2023 1009   Family Acceptance, E, NR by AR at 5/12/2023 1009                               User Key     Initials Effective Dates Name Provider Type Discipline    AR 06/16/21 -  Ese Schmitt, PT Physical Therapist PT              PT Recommendation and Plan  Planned Therapy Interventions (PT): balance training, bed mobility training, gait training, home exercise program, patient/family education, transfer training, ROM (range of motion), stair training, strengthening  Plan of Care Reviewed With: patient, daughter  Outcome Evaluation: Pt admitted from home with +LLE cellulitis, +sepsis and FLAKO.  Pt confused, not following instructions; dtr reports she hasn't slept since Tuesday.  Dtr reports pt normally lives alone, uses cane, bathes and cooks herself; dtr assists w/ groceries/errands.  Today pt not following instructions, provided assist for mobility; required dependent assist x2 for supine<>sit;  mod A for static sitting balance; pt quickly trying to lie back down/resisting support.  Recommend DC to SNU.     Time Calculation:    PT Charges     Row Name 05/12/23 0959             Time Calculation    Start Time 0935  -AR      Stop Time 0953  -AR      Time Calculation (min) 18 min  -AR      PT Received On 05/12/23  -AR      PT -  Next Appointment 05/14/23  -AR      PT Goal Re-Cert Due Date 05/19/23  -AR            User Key  (r) = Recorded By, (t) = Taken By, (c) = Cosigned By    Initials Name Provider Type    Ese Vergara PT Physical Therapist              Therapy Charges for Today     Code Description Service Date Service Provider Modifiers Qty    86865326475 HC PT EVAL MOD COMPLEXITY 4 5/12/2023 Ese Schmitt, PT GP 1    82502841135 HC PT THER PROC EA 15 MIN 5/12/2023 Ese Schmitt, PT GP 1    06101475113 HC PT THER SUPP EA 15 MIN 5/12/2023 Ese Schmitt, PT GP 1          PT G-Codes  Outcome Measure Options: AM-PAC 6 Clicks Basic Mobility (PT)  AM-PAC 6 Clicks Score (PT): 7  PT Discharge Summary  Anticipated Discharge Disposition (PT): skilled nursing facility    Ese Schmitt PT  5/12/2023

## 2023-05-12 NOTE — PROGRESS NOTES
Nutrition Services    Patient Name:  Dayanna Ayala  YOB: 1921  MRN: [id number]  Admit Date:  5/9/2023  Assessment Date:  05/12/23    Comment: Assessment triggered by MST 3. Indication of weight loss, decreased PO intake.     Pt is a 89 yo female admitted with LE swelling and cellulitis that has worsened over the last week despite antibiotics on OP basis. Being treated for sepsis, FLAKO and UTI. Transitioned to palliative care/comfort measures only today. Pt is confused, agitated and oriented to self only per nursing notes. Pt admitted on 5/9 and has had little PO intake of food/fluids since that time (0-25%). There have been concerns about dysphagia - SLP consulted, saw pt earlier today - recommended NPO status. Intake/appetite had reportedly been poor leading up to this admission. Her weight has trended down, from 140 lb in 12/2022 ([facility]) to 120 lb upon admission here - 14% change/5 months.     Patient meets ASPEN/AND criteria for nutrition diagnosis of severe malnutrition of chronic illness based on: insufficient energy intake, weight loss of 20 lb (14%) within 5 months and muscle wasting per limited NPFE done.     As pt is palliative, no aggressive care warranted at this time. Please consult should status change.     CLINICAL NUTRITION ASSESSMENT      Reason for Assessment MST score 2+, Pressure Injury and/or Non-Healing Wound, Reduced Oral Intake Over The Last Month     Diagnosis/Problem   Cellulitis of LE, wounds.      Medical/Surgical History Past Medical History:   Diagnosis Date   • Diabetes mellitus    • Hyperlipidemia    • Hypertension        Past Surgical History:   Procedure Laterality Date   • HEMORRHOIDECTOMY     • THROAT SURGERY          Encounter Information        Nutrition History:  No significant oral intake since admitted on 5/9. SLP evaluated today, recommended NPO. She got choked with nursing taking a bite of pudding.    Food Preferences:    Supplements:   "  Factors Affecting Intake: altered mental status, altered/poor sleep, decreased appetite, impaired cognition, pain issues, swallow impairment     Anthropometrics        Current Height  Current Weight  BMI kg/m2 Height: 165.1 cm (65\")  Weight: 58.1 kg (128 lb) (05/09/23 2207)  Body mass index is 21.3 kg/m².       BMI Category Normal/Healthy (18.4 - 24.9)       Admission Weight 120 lb - bed scale       Ideal Body Weight (IBW) 125 lb           Usual Body Weight (UBW) ~150's years ago. No recent wt on file.   148 lb 11/4/19  140 lb 12/29/22 at [facility]      Weight Change/Trend Loss, Amount/Timeframe: exact time frame unknown but down about 25 lb from UBW; down 20 lb since 12/2022 (14%)       Weight History Wt Readings from Last 30 Encounters:   05/09/23 2207 58.1 kg (128 lb)   05/09/23 1623 54.6 kg (120 lb 6.4 oz)   12/12/16 1918 69.9 kg (154 lb)             Estimated/Assessed Needs       Energy Requirements    Weight for Calculation 58 kg   Method for Estimation  30 kcal/kg   EST Needs (kcal/day) 1740       Protein Requirements    Weight for Calculation 58 kg   EST Protein Needs (g/kg) 1.2 - 1.5 gm/kg   EST Daily Needs (g/day) 69-87       Fluid Requirements     Method for Estimation 1 mL/kcal    Estimated Needs (mL/day) 1740     Tests/Procedures        Tests/Procedures Other: doppler of ankle     Labs       Pertinent Labs    Results from last 7 days   Lab Units 05/12/23  0453 05/11/23  0735 05/10/23  0615 05/09/23  1747   SODIUM mmol/L 150* 144 138 137   POTASSIUM mmol/L 3.8 3.3* 3.6 5.2   CHLORIDE mmol/L 115* 112* 105 103   CO2 mmol/L 19.7* 22.7 23.1 18.0*   BUN mg/dL 26* 33* 54* 78*   CREATININE mg/dL 0.96 1.14* 1.62* 2.34*   CALCIUM mg/dL 8.5 8.2 8.2 8.4   BILIRUBIN mg/dL  --   --   --  0.3   ALK PHOS U/L  --   --   --  68   ALT (SGPT) U/L  --   --   --  11   AST (SGOT) U/L  --   --   --  26   GLUCOSE mg/dL 112* 83 90 142*     Results from last 7 days   Lab Units 05/12/23  0453 05/11/23  0735 " "  PHOSPHORUS mg/dL 3.4 2.1*   HEMOGLOBIN g/dL 14.4 13.8   HEMATOCRIT % 43.9 44.9   WBC 10*3/mm3 28.30* 16.69*   TRIGLYCERIDES mg/dL  --  120   ALBUMIN g/dL 3.0* 2.7*     Results from last 7 days   Lab Units 05/12/23  0453 05/11/23  0735 05/10/23  0615 05/09/23  1628   PLATELETS 10*3/mm3 302 240 269 359     No results found for: COVID19  Lab Results   Component Value Date    HGBA1C 6.20 (H) 05/11/2023          Medications           Scheduled Medications     Infusions     PRN Medications •  acetaminophen **OR** acetaminophen **OR** acetaminophen  •  diphenoxylate-atropine  •  First Mouthwash (Magic Mouthwash)  •  Glycerin-Hypromellose-  •  glycopyrrolate **OR** glycopyrrolate **OR** glycopyrrolate **OR** glycopyrrolate  •  haloperidol **OR** haloperidol **OR** haloperidol lactate  •  HYDROmorphone **OR** Morphine **OR** morphine **OR** ketorolac  •  HYDROmorphone **OR** Morphine **OR** morphine  •  LORazepam **OR** LORazepam **OR** LORazepam  •  LORazepam **OR** LORazepam **OR** LORazepam  •  LORazepam **OR** LORazepam **OR** LORazepam  •  promethazine **OR** promethazine **OR** promethazine     Physical Findings          Physical Appearance agitated, confused, frail, hard of hearing, loss of muscle mass   Oral/Mouth Cavity tooth or teeth missing   Edema  1+ (trace)   Gastrointestinal last bowel movement:5/10 smear   Skin  cellulitis, other: images of LE viewed, left leg appears \"degloved\"    Tubes/Drains/Lines none   NFPE Potential for patient discomfort, Other: images show muscle wasting in calves, around knee     Malnutrition Severity Assessment      Patient meets criteria for : Severe Malnutrition  Malnutrition Type (last 8 hours)     Malnutrition Severity Assessment     Row Name 05/12/23 1700       Malnutrition Severity Assessment    Malnutrition Type Chronic Disease - Related Malnutrition    Row Name 05/12/23 1700       Insufficient Energy Intake     Insufficient Energy Intake Findings Severe    " Insufficient Energy Intake  <75% of est. energy requirement for > or equal to 1 month    Row Name 05/12/23 1700       Unintentional Weight Loss     Unintentional Weight Loss Findings Severe  14% within 5 months    Unintentional Weight Loss  Weight loss greater than 10% in six months    Row Name 05/12/23 1700       Muscle Loss    Loss of Muscle Mass Findings Severe    Patellar Region Severe - prominent bone, square looking, very little muscle definition    Posterior Calf Region Severe - thin with very little definition/firmness    Row Name 05/12/23 1700       Declining Functional Status    Declining Functional Status Findings Measurably Reduced    Row Name 05/12/23 1700       Criteria Met (Must meet criteria for severity in at least 2 of these categories: M Wasting, Fat Loss, Fluid, Secondary Signs, Wt. Status, Intake)    Patient meets criteria for  Severe Malnutrition                   Current Nutrition Orders & Evaluation of Intake       Oral Nutrition     Food Allergies NKFA   Current PO Diet Diet: Regular/House Diet; Texture: Regular Texture (IDDSI 7); Fluid Consistency: Thin (IDDSI 0)   Supplement n/a   PO Evaluation     % PO Intake 0-25%    # of Days Evaluated 4 days   --  PES STATEMENT / NUTRITION DIAGNOSIS      Nutrition Dx Problem  Problem: Malnutrition  Etiology: Medical Diagnosis and Factors Affecting Nutrition Sepsis of LE, diabetes, age  Signs/Symptoms: Report of Minimal PO Intake, NFPE Results and Unintended Weight Change    Comment:    --  NUTRITION INTERVENTION / PLAN OF CARE      Intervention Goal(s) Palliative Care and Other: transitioned to comfort only         RD Intervention/Action Other (comment): RINKU, only when alert/safe to eat/drink         Prescription/Orders:       PO Diet       Supplements       Snacks       Enteral Nutrition       Parenteral Nutrition    New Prescription Ordered? No changes at this time   --      Monitor/Evaluation Per protocol   Discharge Plan/Needs Pending clinical  course   Education Education not appropriate at this time   --    RD to follow per protocol.      Electronically signed by:  Mary Rajan RD  05/12/23 16:36 EDT

## 2023-05-12 NOTE — THERAPY DISCHARGE NOTE
Acute Care - Occupational Therapy Discharge  [facility]    Patient Name: Dayanna Ayala  : 10/10/1921    MRN: [id number]                              Today's Date: 2023       Admit Date: 2023    Visit Dx:     ICD-10-CM ICD-9-CM   1. Cellulitis of left lower extremity  L03.116 682.6   2. Leukocytosis, unspecified type  D72.829 288.60   3. Acute kidney injury  N17.9 584.9     Patient Active Problem List   Diagnosis   • Cellulitis of left lower extremity     Past Medical History:   Diagnosis Date   • Diabetes mellitus    • Hyperlipidemia    • Hypertension      Past Surgical History:   Procedure Laterality Date   • HEMORRHOIDECTOMY     • THROAT SURGERY        General Information     Row Name 23 1442          OT Time and Intention    Document Type discharge evaluation/summary  -RB     Mode of Treatment individual therapy;occupational therapy  -RB     Row Name 23 1442          General Information    Patient Profile Reviewed yes  -RB     Prior Level of Function min assist:;ADL's;transfer  -RB     Existing Precautions/Restrictions fall  -RB     Barriers to Rehab medically complex;cognitive status;hearing deficit  -RB     Row Name 23 1442          Living Environment    People in Home alone  -RB     Row Name 23 1442          Home Main Entrance    Number of Stairs, Main Entrance four  -RB     Stair Railings, Main Entrance railings safe and in good condition  -RB     Row Name 23 1442          Cognition    Orientation Status (Cognition) disoriented to;person;situation;time;place;other (see comments)  unable to understand what the pt was saying. she was reaching for objects in the air that were not actually there  -RB     Row Name 23 1442          Safety Issues, Functional Mobility    Safety Issues Affecting Function (Mobility) ability to follow commands  -RB     Impairments Affecting Function (Mobility) balance;cognition;endurance/activity tolerance;postural/trunk control;strength   -RB           User Key  (r) = Recorded By, (t) = Taken By, (c) = Cosigned By    Initials Name Provider Type    Jennifer Hedrick OT Occupational Therapist               Mobility/ADL's     Row Name 05/12/23 1443          Bed Mobility    Bed Mobility sit-supine  -RB     Sit-Supine [place] (Bed Mobility) dependent (less than 25% patient effort);2 person assist;verbal cues;nonverbal cues (demo/gesture)  -RB     Assistive Device (Bed Mobility) bed rails  -RB     Comment, (Bed Mobility) pt unable to assist in any way with bed mobility. pushing opposite direction  -RB     Row Name 05/12/23 1443          Functional Mobility    Functional Mobility- Ind. Level not appropriate to assess  -RB     Row Name 05/12/23 1443          Activities of Daily Living    BADL Assessment/Intervention grooming  -     Row Name 05/12/23 1443          Grooming Assessment/Training    [place] Level (Grooming) grooming skills;wash face, hands;maximum assist (25% patient effort)  -RB     Assistive Devices (Grooming) hand over hand  -RB     Position (Grooming) supported sitting  -RB           User Key  (r) = Recorded By, (t) = Taken By, (c) = Cosigned By    Initials Name Provider Type    Jennifer Hedrick OT Occupational Therapist               Obj/Interventions     Row Name 05/12/23 1444          Sensory Assessment (Somatosensory)    Sensory Assessment (Somatosensory) unable/difficult to assess  -RB     Row Name 05/12/23 1444          Vision Assessment/Intervention    Visual Impairment/Limitations unable/difficult to assess  -RB     Row Name 05/12/23 1444          Range of Motion Comprehensive    Comment, General Range of Motion BUE WFL with reaching  -RB     Row Name 05/12/23 1444          Strength Comprehensive (MMT)    Comment, General Manual Muscle Testing (MMT) Assessment BUE/BLE generalized weakness  -RB     Row Name 05/12/23 1444          Balance    Comment, Balance Pt pushing back at the EOB with PT, max-dependent to  return back to supine  -RB           User Key  (r) = Recorded By, (t) = Taken By, (c) = Cosigned By    Initials Name Provider Type    Jennifer Hedrick OT Occupational Therapist               Goals/Plan     Row Name 05/12/23 1445          Transfer Goal 1 (OT)    Activity/Assistive Device (Transfer Goal 1, OT) transfers, all  -RB     [place] Level/Cues Needed (Transfer Goal 1, OT) supervision required  -RB     Time Frame (Transfer Goal 1, OT) short term goal (STG);2 weeks  -RB     Progress/Outcome (Transfer Goal 1, OT) continuing progress toward goal  -RB     Row Name 05/12/23 1446          Dressing Goal 1 (OT)    Activity/Device (Dressing Goal 1, OT) dressing skills, all  -RB     [place]/Cues Needed (Dressing Goal 1, OT) supervision required  -RB     Time Frame (Dressing Goal 1, OT) short term goal (STG);2 weeks  -RB     Progress/Outcome (Dressing Goal 1, OT) continuing progress toward goal  -RB     Row Name 05/12/23 1442          Therapy Assessment/Plan (OT)    Planned Therapy Interventions (OT) BADL retraining  -RB           User Key  (r) = Recorded By, (t) = Taken By, (c) = Cosigned By    Initials Name Provider Type    Jennifer Hedrick OT Occupational Therapist               Clinical Impression     Row Name 05/12/23 1442          Pain Assessment    Pre/Posttreatment Pain Comment she was unable to provide a pain rating, activity as tolerated  -RB     Pain Intervention(s) Repositioned  -RB     Row Name 05/12/23 144          Plan of Care Review    Plan of Care Reviewed With patient;daughter  -RB     Progress no change  -RB     Row Name 05/12/23 1446          Therapy Assessment/Plan (OT)    Criteria for Skilled Therapeutic Interventions Met (OT) no;other (see comments)  Post session the pt was changed to DNR and comfort measures only. She will be transferring to the Palliative care unit  -RB     Row Name 05/12/23 1449          Vital Signs    Pre Patient Position Sitting  -RB     Intra Patient  Position Sitting  -RB     Post Patient Position Supine  -RB     Row Name 05/12/23 1445          Positioning and Restraints    Pre-Treatment Position in bed  -RB     Post Treatment Position bed  -RB     In Bed notified nsg;supine;call light within reach;encouraged to call for assist;exit alarm on;fowlers;legs elevated  -RB           User Key  (r) = Recorded By, (t) = Taken By, (c) = Cosigned By    Initials Name Provider Type    RB Jennifer Flood OT Occupational Therapist               Outcome Measures     Row Name 05/12/23 1447          How much help from another is currently needed...    Putting on and taking off regular lower body clothing? 1  -RB     Bathing (including washing, rinsing, and drying) 1  -RB     Toileting (which includes using toilet bed pan or urinal) 1  -RB     Putting on and taking off regular upper body clothing 1  -RB     Taking care of personal grooming (such as brushing teeth) 1  -RB     Eating meals 1  -RB     AM-PAC 6 Clicks Score (OT) 6  -RB     Row Name 05/12/23 1008 05/12/23 0800       How much help from another person do you currently need...    Turning from your back to your side while in flat bed without using bedrails? 2  -AR 2  -DAYANA    Moving from lying on back to sitting on the side of a flat bed without bedrails? 1  -AR 1  -DAYANA    Moving to and from a bed to a chair (including a wheelchair)? 1  -AR 1  -DAYANA    Standing up from a chair using your arms (e.g., wheelchair, bedside chair)? 1  -AR 1  -DAYANA    Climbing 3-5 steps with a railing? 1  -AR 1  -DAYANA    To walk in hospital room? 1  -AR 1  -DAYANA    AM-PAC 6 Clicks Score (PT) 7  -AR 7  -DAYANA    Highest level of mobility 2 --> Bed activities/dependent transfer  -AR 2 --> Bed activities/dependent transfer  -DAYANA    Row Name 05/12/23 1447          Modified [place] Scale    Modified [place] Scale 5 - Severe disability.  Bedridden, incontinent, and requiring constant nursing care and attention.  -RB     Row Name 05/12/23 1447 05/12/23 1008        Functional Assessment    Outcome Measure Options AM-PAC 6 Clicks Daily Activity (OT);Modified [place]  -RB AM-PAC 6 Clicks Basic Mobility (PT)  -AR          User Key  (r) = Recorded By, (t) = Taken By, (c) = Cosigned By    Initials Name Provider Type    Ese Vergara, PT Physical Therapist    Mark Neumann, RN Registered Nurse    Jennifer Hedrick, OT Occupational Therapist              Occupational Therapy Education     Title: PT OT SLP Therapies (In Progress)     Topic: Occupational Therapy (Not Started)     Point: ADL training (Not Started)     Description:   Instruct learner(s) on proper safety adaptation and remediation techniques during self care or transfers.   Instruct in proper use of assistive devices.              Learner Progress:  Not documented in this visit.          Point: Home exercise program (Not Started)     Description:   Instruct learner(s) on appropriate technique for monitoring, assisting and/or progressing therapeutic exercises/activities.              Learner Progress:  Not documented in this visit.          Point: Precautions (Not Started)     Description:   Instruct learner(s) on prescribed precautions during self-care and functional transfers.              Learner Progress:  Not documented in this visit.          Point: Body mechanics (Not Started)     Description:   Instruct learner(s) on proper positioning and spine alignment during self-care, functional mobility activities and/or exercises.              Learner Progress:  Not documented in this visit.                          OT Recommendation and Plan  Planned Therapy Interventions (OT): BADL retraining  Plan of Care Review  Plan of Care Reviewed With: patient, daughter  Progress: no change  Plan of Care Reviewed With: patient, daughter     Time Calculation:    Time Calculation- OT     Row Name 05/12/23 1442             Time Calculation- OT    OT Start Time 0944  -      OT Stop Time 0953  -RB      OT Time Calculation  (min) 9 min  -RB      OT Received On 05/12/23  -RB         Untimed Charges    OT Eval/Re-eval Minutes 9  -RB         Total Minutes    Untimed Charges Total Minutes 9  -RB       Total Minutes 9  -RB            User Key  (r) = Recorded By, (t) = Taken By, (c) = Cosigned By    Initials Name Provider Type    RB Jennifer Flood OT Occupational Therapist              Therapy Charges for Today     Code Description Service Date Service Provider Modifiers Qty    [id number] HC OT EVAL MOD COMPLEXITY 3 5/12/2023 Jennifer Flood OT GO 1                  Jennifer Flood OT  5/12/2023

## 2023-05-12 NOTE — PLAN OF CARE
Goal Outcome Evaluation:           Progress: declining  Outcome Evaluation: 89 y/o admitted for cellulitis LLE, FLAKO and UTI which has worsened since arriving to the floor. Pt now confused, AOx self only, irritated and restless throughout shift, tense in posture. Pt does not verbalize needs and is noted to have a slurred speech when attempting to communicate. All Wound chnages completed this shift and are currently c/d/i. Ativan and morphine provided to pt for comfort. Discussions had w/ daughter in attempts to console and provide support given. Needs met. Pt has been tachycardic this shift. Report given to Palliative RN and awaiting transport.

## 2023-05-12 NOTE — PLAN OF CARE
The pt is a 89 year old female who was admitted to [facility] 2/2 an acute kidney injury, UTI, BLE cellulitis with sepsis. Pmhx significant for diabetes, hypertension, hyperlipidemia and neuropathy. She is very [place]. She lives alone, independent with ADL's and her family assists as needed with IADL's. Today, the pt was sitting at the EOB with PT. Pt resisting therapists and attempting to move herself back to supine. Max A x2 provided to assist her back into supine. Pt mouthing words, unable to understand what the pt was saying. Pt reaching or objects not there. She was able to wash her face with hand over hand assist briefly.    OT S/O 5/12 - pt being transitioned to 4P with comfort measures.

## 2023-05-12 NOTE — PLAN OF CARE
Goal Outcome Evaluation:  Plan of Care Reviewed With: patient        Progress: no change  Outcome Evaluation: VSS, very confused throughout shift, multiple attempt to get OOB, reaching and ripping thing off the wall(BP cuff and Purewick), brief in place, morphine given once prior to dressing change, has not slept at all during shift, NS@75, unable to educate R/T confusion, WCTM

## 2023-05-12 NOTE — PLAN OF CARE
Goal Outcome Evaluation:  Plan of Care Reviewed With: patient, daughter           Outcome Evaluation: Orders received due to RN reports of excessive coughing with morning medications whole with puree. Daughter at bedside. Patient with suspected agitation as persistent fidgeting and reaching noted during assessment. No meaningful verbalizations, however, grunting vocalizations observed. Daughter at bedside reports significant decline as pt was talking and consumed dinner previous date. Clinician attempted ice chip and puree trials this date, however no labial closure appreciated from spoon. Clinician provided ice chip and puree stimulation to bottom lip resulting in pt closing mouth and initiating a swallow. No overt s/s of aspiration demonstrated with very trace amounts of melted ice and puree. Pt is not appropriate for a diet at this time due altered mental status. Recommend NPO and consider temporary alternate means of nutrition. Meds via alternate route. Speech to follow for re-eval when pt appropriate for PO trials.

## 2023-05-12 NOTE — PROGRESS NOTES
"  Infectious Diseases Progress Note    Costa Graff MD     [facility]  [place]: 3 days  Patient Identification:  Name: Dayanna Ayala  Age: 89 y.o.  Sex: female  :  10/10/1921  MRN: [id number]         Primary Care Physician: Lucy Walker MD        Subjective: combative and agitated last night confused  Interval History:Events noted as patient remain agitated combative all night with no sleep   See consultation note  Objective:    Scheduled Meds:aspirin, 81 mg, Oral, Daily  atorvastatin, 10 mg, Oral, Nightly  castor oil-balsam peru, 1 application, Topical, Q12H  cefepime, 1 g, Intravenous, Q24H  famotidine, 20 mg, Oral, BID AC  insulin lispro, 2-7 Units, Subcutaneous, 4x Daily With Meals & Nightly  Menthol-Zinc Oxide, 1 application, Topical, BID  vancomycin, 750 mg, Intravenous, Q24H      Continuous Infusions:Pharmacy to dose vancomycin,   sodium chloride, 75 mL/hr, Last Rate: 75 mL/hr (23 0110)        Vital signs in last 24 hours:  Temp:  [97.3 °F (36.3 °C)-98 °F (36.7 °C)] 97.7 °F (36.5 °C)  Heart Rate:  [] 113  Resp:  [16-18] 18  BP: ()/(57-79) 94/65    Intake/Output:    Intake/Output Summary (Last 24 hours) at 2023 0833  Last data filed at 2023 0626  Gross per 24 hour   Intake 1667.25 ml   Output 200 ml   Net 1467.25 ml       Exam:  BP 94/65 (BP Location: Left arm, Patient Position: Lying)   Pulse 113   Temp 97.7 °F (36.5 °C) (Axillary)   Resp 18   Ht 165.1 cm (65\")   Wt 58.1 kg (128 lb)   SpO2 96%   BMI 21.30 kg/m²   Patient is examined using the personal protective equipment as per guidelines from infection control for this particular patient as enacted.  Hand washing was performed before and after patient interaction.  General Appearance:    confused                          Head:    Normocephalic, without obvious abnormality, atraumatic                           Eyes:    PERRL, conjunctivae/corneas clear, EOM's intact, both eyes                         " Throat:   Lips, tongue, gums normal; oral mucosa pink and moist                           Neck:   Supple, symmetrical, trachea midline, no JVD                         Lungs:    Clear to auscultation bilaterally, respirations unlabored                 Chest Wall:    No tenderness or deformity                          Heart:    tachycardiac                  Abdomen:     soft                 Extremities:                                                   Pulses:   Pulses palpable in all extremities                            Skin:   Changes noted                  Neurologic:   lethargic       Data Review:    I reviewed the patient's new clinical results.  Results from last 7 days   Lab Units 05/12/23 0453 05/11/23 0735 05/10/23  0615 05/09/23  1628   WBC 10*3/mm3 28.30* 16.69* 26.21* 26.12*   HEMOGLOBIN g/dL 14.4 13.8 13.6 14.6   PLATELETS 10*3/mm3 302 240 269 359     Results from last 7 days   Lab Units 05/12/23 0453 05/11/23 0735 05/10/23  0615 05/09/23  1747   SODIUM mmol/L 150* 144 138 137   POTASSIUM mmol/L 3.8 3.3* 3.6 5.2   CHLORIDE mmol/L 115* 112* 105 103   CO2 mmol/L 19.7* 22.7 23.1 18.0*   BUN mg/dL 26* 33* 54* 78*   CREATININE mg/dL 0.96 1.14* 1.62* 2.34*   CALCIUM mg/dL 8.5 8.2 8.2 8.4   GLUCOSE mg/dL 112* 83 90 142*     Microbiology Results (last 10 days)     Procedure Component Value - Date/Time    MRSA Screen, PCR (Inpatient) - Swab, Nares [[id number]]  (Normal) Collected: 05/11/23 1058    Lab Status: Final result Specimen: Swab from Nares Updated: 05/11/23 1223     MRSA PCR No MRSA Detected    Narrative:      The negative predictive value of this diagnostic test is high and should only be used to consider de-escalating anti-MRSA therapy. A positive result may indicate colonization with MRSA and must be correlated clinically.    Eosinophil Smear - Urine, Urine, Clean Catch [[id number]]  (Normal) Collected: 05/10/23 2233    Lab Status: Final result Specimen: Urine, Clean Catch Updated: 05/10/23 9798      Eosinophil Smear 0 % EOS/100 Cells     Urine Culture - Urine, Urine, Catheter [[id number]]  (Normal) Collected: 05/09/23 1933    Lab Status: Final result Specimen: Urine, Catheter Updated: 05/10/23 2040     Urine Culture No growth    Blood Culture - Blood, Arm, Right [[id number]]  (Normal) Collected: 05/09/23 1706    Lab Status: Preliminary result Specimen: Blood from Arm, Right Updated: 05/11/23 1715     Blood Culture No growth at 2 days    Blood Culture - Blood, Arm, Left [[id number]]  (Normal) Collected: 05/09/23 1646    Lab Status: Preliminary result Specimen: Blood from Arm, Left Updated: 05/11/23 1700     Blood Culture No growth at 2 days            Assessment:    Cellulitis of left lower extremity  1-cellulitis of the left foot and leg in the setting of chronic lower extremity recurrent wound concerning for venous stasis versus ischemic process and likely mixed infection  2-acute on chronic renal failure improved  3-mild leukocytosis unclear whether this reaction to infection or primary marrow process such as CML  4-immobility  5-diabetes mellitus  6-hypertension  7-other diagnoses per primary team.     Recommendations/Discussions:  See my discussion on 5/11/2023.  Given delirium and confusion I think care structure discussion should take place as she is liable to decline because of her underlying dementia and would suffer from pain due to infected wound of the left leg  Costa Graff MD  5/12/2023  08:33 EDT    Parts of this note may be an electronic transcription/translation of spoken language to printed text using the Dragon dictation system.

## 2023-05-12 NOTE — PROGRESS NOTES
"   LOS: 3 days     Chief Complaint/ Reason for encounter: FLAKO    Subjective   05/11/23 : No new complaints but much more confused today  Difficult to get much additional history  Palliative care has met with family and they are considering options depending on her progress this admission    5/12: Remains very confused today, according to her family member she has not slept at all since she has been here.  Somewhat agitated, not eating or drinking due to dysphagia concerns    Medical history reviewed:  History of Present Illness    Subjective    History taken from: Patient and chart    Vital Signs  Temp:  [97.3 °F (36.3 °C)-98.4 °F (36.9 °C)] 98.4 °F (36.9 °C)  Heart Rate:  [] 109  Resp:  [16-18] 16  BP: ()/(57-79) 134/69       Wt Readings from Last 1 Encounters:   05/09/23 2207 58.1 kg (128 lb)   05/09/23 1623 54.6 kg (120 lb 6.4 oz)       Objective:  Vital signs: (most recent): Blood pressure 134/69, pulse 109, temperature 98.4 °F (36.9 °C), temperature source Axillary, resp. rate 16, height 165.1 cm (65\"), weight 58.1 kg (128 lb), SpO2 97 %.              Objective:  General Appearance:  Comfortable, confused, chronically ill-appearing, in no acute distress and not in pain.  Awake, not oriented  HEENT: Mucous membranes moist, no injury, oropharynx clear  Lungs:  Normal effort and normal respiratory rate.  Breath sounds clear to auscultation.  No  respiratory distress.  No rales, decreased breath sounds or rhonchi.    Heart: Normal rate.  Regular rhythm.  S1, S2 normal.  No murmur.   Abdomen: Abdomen is soft.  Bowel sounds are normal, no abdominal tenderness.  There is no rebound or guarding  Extremities: No significant edema of bilateral lower extremities, legs wrapped  Neurological: No focal motor or sensory deficits, pupils reactive  Skin:  Warm and dry.  No rash or cyanosis.       Results Review:    Intake/Output:     Intake/Output Summary (Last 24 hours) at 5/12/2023 1024  Last data filed at " 5/12/2023 0900  Gross per 24 hour   Intake 1667.25 ml   Output 200 ml   Net 1467.25 ml         DATA:  Radiology and Labs:  The following labs independently reviewed by me. Additional labs ordered for tomorrow a.m.  Interval notes, chart personally reviewed by me.   Old records independently reviewed showing normal baseline creatinine    Risk/ complexity of medical care/ medical decision making high complexity, worsening electrolyte abnormalities requiring IV fluids, persistent confusion      Labs:   Recent Results (from the past 24 hour(s))   MRSA Screen, PCR (Inpatient) - Swab, Nares    Collection Time: 05/11/23 10:58 AM    Specimen: Nares; Swab   Result Value Ref Range    MRSA PCR No MRSA Detected No MRSA Detected   POC Glucose Once    Collection Time: 05/11/23 11:22 AM    Specimen: Blood   Result Value Ref Range    Glucose 131 (H) 70 - 130 mg/dL   POC Glucose Once    Collection Time: 05/11/23  5:23 PM    Specimen: Blood   Result Value Ref Range    Glucose 120 70 - 130 mg/dL   POC Glucose Once    Collection Time: 05/11/23  9:07 PM    Specimen: Blood   Result Value Ref Range    Glucose 166 (H) 70 - 130 mg/dL   Renal Function Panel    Collection Time: 05/12/23  4:53 AM    Specimen: Blood   Result Value Ref Range    Glucose 112 (H) 65 - 99 mg/dL    BUN 26 (H) 8 - 23 mg/dL    Creatinine 0.96 0.57 - 1.00 mg/dL    Sodium 150 (H) 136 - 145 mmol/L    Potassium 3.8 3.5 - 5.2 mmol/L    Chloride 115 (H) 98 - 107 mmol/L    CO2 19.7 (L) 22.0 - 29.0 mmol/L    Calcium 8.5 8.2 - 9.6 mg/dL    Albumin 3.0 (L) 3.5 - 5.2 g/dL    Phosphorus 3.4 2.5 - 4.5 mg/dL    Anion Gap 15.3 (H) 5.0 - 15.0 mmol/L    BUN/Creatinine Ratio 27.1 (H) 7.0 - 25.0    eGFR 52.6 (L) >60.0 mL/min/1.73   CBC Auto Differential    Collection Time: 05/12/23  4:53 AM    Specimen: Blood   Result Value Ref Range    WBC 28.30 (H) 3.40 - 10.80 10*3/mm3    RBC 4.74 3.77 - 5.28 10*6/mm3    Hemoglobin 14.4 12.0 - 15.9 g/dL    Hematocrit 43.9 34.0 - 46.6 %    MCV 92.6  79.0 - 97.0 fL    MCH 30.4 26.6 - 33.0 pg    MCHC 32.8 31.5 - 35.7 g/dL    RDW 13.4 12.3 - 15.4 %    RDW-SD 45.2 37.0 - 54.0 fl    MPV 10.0 6.0 - 12.0 fL    Platelets 302 140 - 450 10*3/mm3    Neutrophil % 88.2 (H) 42.7 - 76.0 %    Lymphocyte % 4.2 (L) 19.6 - 45.3 %    Monocyte % 5.4 5.0 - 12.0 %    Eosinophil % 0.2 (L) 0.3 - 6.2 %    Basophil % 0.3 0.0 - 1.5 %    Immature Grans % 1.7 (H) 0.0 - 0.5 %    Neutrophils, Absolute 24.98 (H) 1.70 - 7.00 10*3/mm3    Lymphocytes, Absolute 1.19 0.70 - 3.10 10*3/mm3    Monocytes, Absolute 1.52 (H) 0.10 - 0.90 10*3/mm3    Eosinophils, Absolute 0.05 0.00 - 0.40 10*3/mm3    Basophils, Absolute 0.09 0.00 - 0.20 10*3/mm3    Immature Grans, Absolute 0.47 (H) 0.00 - 0.05 10*3/mm3    nRBC 0.0 0.0 - 0.2 /100 WBC   POC Glucose Once    Collection Time: 05/12/23  7:17 AM    Specimen: Blood   Result Value Ref Range    Glucose 106 70 - 130 mg/dL       Radiology:  Pertinent radiology studies were reviewed as described above      Medications have been reviewed separately in chart overview      ASSESSMENT:  acute kidney injury, appears to be predominantly prerenal, improved with IV fluids, nearing baseline of 0.9   hypokalemia, improved after replacement  hypophosphatemia, improved post replacement  Volume depletion/dehydration, better on IV fluids  New hyponatremia  Cellulitis with sepsis  Leukocytosis  Chronic hypertension on a thiazide diuretic  Hyperlipidemia  Diabetes mellitus on metformin and glimepiride        DISCUSSION/PLAN:   Renal function continues to improve with IV fluids and discontinuation of metformin/HCTZ   Sodium today is 150  Will change IV fluids to D5W  Continue to monitor and replace electrolytes as needed  Currently n.p.o. due to dysphagia concerns  Continue to hold metformin and HCTZ  Follow-up a.m. labs  Palliative care met with family and they are discussing options  IV antibiotics per ID, dosed for GFR around 40     Consider alternative antibiotics to cefepime with  her worsening confusion.  She is not receiving any other sedating medications or IV analgesics     Continue to monitor electrolytes and volume closely, avoid IV contrast and nephrotoxic medications        Sterling Chatterjee MD   [facility]   Office phone number: [phone]  Answering service phone number: [phone]    05/12/23  10:24 EDT    Dictation performed using Dragon dictation software

## 2023-05-12 NOTE — PLAN OF CARE
Goal Outcome Evaluation:  Plan of Care Reviewed With: patient, daughter           Outcome Evaluation: Pt admitted from home with +LLE cellulitis, +sepsis and FLAKO.  Pt confused, not following instructions; dtr reports she hasn't slept since Tuesday.  Dtr reports pt normally lives alone, uses cane, bathes and cooks herself; dtr assists w/ groceries/errands.  Today pt not following instructions, provided assist for mobility; required dependent assist x2 for supine<>sit;  mod A for static sitting balance; pt quickly trying to lie back down/resisting support.  Recommend DC to SNU.

## 2023-05-12 NOTE — PROGRESS NOTES
"Daily progress note    Primary care physician  Dr. Walker    Chief complaint  Events noted as patient remain agitated combative all night with no sleep and patient family at bedside.    History of present illness  89-year-old white female with history of diabetes hypertension hyperlipidemia and neuropathy brought to the emergency room by the family with left foot swelling redness pain going on for last few days to week with no improvement with oral antibiotics.  Patient denies any fever chills chest pain shortness of breath abdominal pain nausea vomiting diarrhea.  Patient work-up in ER revealed acute kidney injury and also cellulitis with sepsis admit for management.  Patient is DNR per her wishes.      REVIEW OF SYSTEMS  Unable to obtain    PHYSICAL EXAM  Blood pressure 134/69, pulse 109, temperature 98.4 °F (36.9 °C), temperature source Axillary, resp. rate 16, height 165.1 cm (65\"), weight 58.1 kg (128 lb), SpO2 97 %.    GENERAL:  Awake and alert in no respiratory distress   HEENT:  Unremarkable  NECK:  Supple  CV: regular rhythm, normal rate  RESPIRATORY: normal effort and moving air bilaterally  ABDOMEN: soft, NTND: Bowel sounds positive  MUSCULOSKELETAL: no deformity  NEURO: alert and oriented x1 with a nonfocal neuro exam  PSYCH:  calm, cooperative  SKIN: The patient's left leg was wrapped with ABD pads.       LAB RESULTS  Lab Results (last 24 hours)     Procedure Component Value Units Date/Time    POC Glucose Once [[id number]]  (Normal) Collected: 05/12/23 0717    Specimen: Blood Updated: 05/12/23 0718     Glucose 106 mg/dL      Comment: Meter: [id number] : [id number] Rebecca MARTINEZ       CBC & Differential [[id number]]  (Abnormal) Collected: 05/12/23 0453    Specimen: Blood Updated: 05/12/23 0551    Narrative:      The following orders were created for panel order CBC & Differential.  Procedure                               Abnormality         Status                     ---------                  "              -----------         ------                     CBC Auto Differential[[id number]]        Abnormal            Final result                 Please view results for these tests on the individual orders.    CBC Auto Differential [[id number]]  (Abnormal) Collected: 05/12/23 0453    Specimen: Blood Updated: 05/12/23 0551     WBC 28.30 10*3/mm3      RBC 4.74 10*6/mm3      Hemoglobin 14.4 g/dL      Hematocrit 43.9 %      MCV 92.6 fL      MCH 30.4 pg      MCHC 32.8 g/dL      RDW 13.4 %      RDW-SD 45.2 fl      MPV 10.0 fL      Platelets 302 10*3/mm3      Neutrophil % 88.2 %      Lymphocyte % 4.2 %      Monocyte % 5.4 %      Eosinophil % 0.2 %      Basophil % 0.3 %      Immature Grans % 1.7 %      Neutrophils, Absolute 24.98 10*3/mm3      Lymphocytes, Absolute 1.19 10*3/mm3      Monocytes, Absolute 1.52 10*3/mm3      Eosinophils, Absolute 0.05 10*3/mm3      Basophils, Absolute 0.09 10*3/mm3      Immature Grans, Absolute 0.47 10*3/mm3      nRBC 0.0 /100 WBC     Renal Function Panel [[id number]]  (Abnormal) Collected: 05/12/23 0453    Specimen: Blood Updated: 05/12/23 0531     Glucose 112 mg/dL      BUN 26 mg/dL      Creatinine 0.96 mg/dL      Sodium 150 mmol/L      Potassium 3.8 mmol/L      Chloride 115 mmol/L      CO2 19.7 mmol/L      Calcium 8.5 mg/dL      Albumin 3.0 g/dL      Phosphorus 3.4 mg/dL      Anion Gap 15.3 mmol/L      BUN/Creatinine Ratio 27.1     eGFR 52.6 mL/min/1.73     Narrative:      GFR Normal >60  Chronic Kidney Disease <60  Kidney Failure <15    The GFR formula is only valid for adults with stable renal function between ages 18 and 70.    POC Glucose Once [[id number]]  (Abnormal) Collected: 05/11/23 2107    Specimen: Blood Updated: 05/11/23 2108     Glucose 166 mg/dL      Comment: Meter: [id number] : [id number] Joaquina Diamonique NA       POC Glucose Once [[id number]]  (Normal) Collected: 05/11/23 1723    Specimen: Blood Updated: 05/11/23 1724     Glucose 120 mg/dL      Comment: Meter: [id number]  : [id number] Rebecca MARTINEZ       Blood Culture - Blood, Arm, Right [[id number]]  (Normal) Collected: 05/09/23 1706    Specimen: Blood from Arm, Right Updated: 05/11/23 1715     Blood Culture No growth at 2 days    Blood Culture - Blood, Arm, Left [[id number]]  (Normal) Collected: 05/09/23 1646    Specimen: Blood from Arm, Left Updated: 05/11/23 1700     Blood Culture No growth at 2 days        Imaging Results (Last 24 Hours)     ** No results found for the last 24 hours. **          Current Facility-Administered Medications:   •  acetaminophen (TYLENOL) tablet 650 mg, 650 mg, Oral, Q4H PRN, Manolo Barrera MD, 650 mg at 05/12/23 0812  •  aspirin chewable tablet 81 mg, 81 mg, Oral, Daily, Manolo Barrera MD, 81 mg at 05/12/23 0812  •  atorvastatin (LIPITOR) tablet 10 mg, 10 mg, Oral, Nightly, Manolo Barrera MD, 10 mg at 05/11/23 2110  •  castor oil-balsam peru (VENELEX) ointment 1 application, 1 application, Topical, Q12H, Manolo Barrera MD, 1 application at 05/12/23 0853  •  cefepime 1 gm IVPB in 100 mL NS (VTB), 1 g, Intravenous, Q24H, Costa Graff MD, 1 g at 05/12/23 0823  •  dextrose (D5W) 5 % infusion, 100 mL/hr, Intravenous, Continuous, Sterling Cahtterjee MD, Last Rate: 100 mL/hr at 05/12/23 1043, 100 mL/hr at 05/12/23 1043  •  famotidine (PEPCID) tablet 20 mg, 20 mg, Oral, BID AC, Manolo Barrera MD, 20 mg at 05/11/23 1706  •  insulin lispro (HUMALOG/ADMELOG) injection 2-7 Units, 2-7 Units, Subcutaneous, 4x Daily With Meals & Nightly, Manolo Barrera MD, 2 Units at 05/11/23 2110  •  Menthol-Zinc Oxide 1 application, 1 application, Topical, BID, Manolo Barrera MD, 1 application at 05/12/23 0854  •  morphine injection 2 mg, 2 mg, Intravenous, Q4H PRN, Manolo Barrera MD, 2 mg at 05/11/23 2241  •  ondansetron (ZOFRAN) injection 4 mg, 4 mg, Intravenous, Q6H PRN, Manolo Barrera MD  •  Pharmacy to dose vancomycin, , Does not apply, Continuous PRN, Manolo Barrera MD  •  vancomycin 750 mg in sodium chloride 0.9 % 250  mL IVPB-VTB, 750 mg, Intravenous, Q24H, Sterling Chatterjee MD, 750 mg at 05/12/23 0007     ASSESSMENT  Severe dementia with behavioral disturbance  Left lower extremity cellulitis with sepsis  Acute kidney injury  Hypernatremia  Diabetes mellitus   Hypertension  Hyperlipidemia  Neuropathy    PLAN  Long discussion with patient POA in presence of nursing staff about her diagnosis prognosis and possible outcome and they decided to withdraw the care and keep patient comfortable in palliative care unit.  I agree with the decision and will comply.    ANTWAN ARIAS MD    Copied text in this note has been reviewed and is accurate as of 05/12/23

## 2023-05-12 NOTE — NURSING NOTE
Per [facility] pt has been cleared for surgery this afternoon. A physician from Dr. Fay's group faxed paperwork for clearance on this patient. Will Notify Dr. Troncoso.

## 2023-05-12 NOTE — CASE MANAGEMENT/SOCIAL WORK
Continued Stay Note  [facility]     Patient Name: Dayanna Ayala  MRN: [id number]  Today's Date: 5/12/2023    Admit Date: 5/9/2023    Plan: Home with [facility] vs. SNF   Discharge Plan     Row Name 05/12/23 1530       Plan    Plan Comments Patient transferring to . CCP will follow as needed.               Discharge Codes    No documentation.                     Kristen Jorge RN

## 2023-05-13 NOTE — PLAN OF CARE
Goal Outcome Evaluation:  Plan of Care Reviewed With: daughter        Progress: declining  Outcome Evaluation: Premed with Morphine 4mg and Ativan 1mg. F/C. Spec bed. Dsng to RLE intact. Daughter at bedside intermittently updated.         Problem: Infection  Goal: Absence of Infection Signs and Symptoms  Outcome: Unable to Meet, Plan Revised     Problem: Diabetes Comorbidity  Goal: Blood Glucose Level Within Targeted Range  Outcome: Unable to Meet, Plan Revised     Problem: Hypertension Comorbidity  Goal: Blood Pressure in Desired Range  Outcome: Unable to Meet, Plan Revised

## 2023-05-13 NOTE — PROGRESS NOTES
"Daily progress note    Primary care physician  Dr. Walker    Chief complaint  Comfortable with no new issues and family at bedside    History of present illness  89-year-old white female with history of diabetes hypertension hyperlipidemia and neuropathy brought to the emergency room by the family with left foot swelling redness pain going on for last few days to week with no improvement with oral antibiotics.  Patient denies any fever chills chest pain shortness of breath abdominal pain nausea vomiting diarrhea.  Patient work-up in ER revealed acute kidney injury and also cellulitis with sepsis admit for management.  Patient is DNR per her wishes.      REVIEW OF SYSTEMS  Unable to obtain    PHYSICAL EXAM  Blood pressure 140/61, pulse 106, temperature 97.6 °F (36.4 °C), temperature source Oral, resp. rate 12, height 165.1 cm (65\"), weight 58.1 kg (128 lb), SpO2 97 %.    Unchanged     LAB RESULTS  Lab Results (last 24 hours)     Procedure Component Value Units Date/Time    Blood Culture - Blood, Arm, Right [[id number]]  (Normal) Collected: 05/09/23 1706    Specimen: Blood from Arm, Right Updated: 05/12/23 1715     Blood Culture No growth at 3 days    Blood Culture - Blood, Arm, Left [[id number]]  (Normal) Collected: 05/09/23 1646    Specimen: Blood from Arm, Left Updated: 05/12/23 1700     Blood Culture No growth at 3 days        Imaging Results (Last 24 Hours)     ** No results found for the last 24 hours. **          Current Facility-Administered Medications:   •  acetaminophen (TYLENOL) tablet 650 mg, 650 mg, Oral, Q4H PRN **OR** acetaminophen (TYLENOL) 160 MG/5ML solution 650 mg, 650 mg, Oral, Q4H PRN **OR** acetaminophen (TYLENOL) suppository 650 mg, 650 mg, Rectal, Q4H PRN, Manolo Barrera MD  •  diphenoxylate-atropine (LOMOTIL) 2.5-0.025 MG per tablet 1 tablet, 1 tablet, Oral, Q2H PRN, Manolo Barrera MD  •  First Mouthwash (Magic Mouthwash) 5 mL, 5 mL, Swish & Spit, Q4H PRN, Manolo Barrera MD  •  " Glycerin-Hypromellose- (ARTIFICIAL TEARS) 0.2-0.2-1 % ophthalmic solution solution 1 drop, 1 drop, Both Eyes, Q30 Min PRN, Manolo Barrera MD  •  glycopyrrolate (ROBINUL) injection 0.2 mg, 0.2 mg, Intravenous, Q2H PRN **OR** glycopyrrolate (ROBINUL) injection 0.2 mg, 0.2 mg, Subcutaneous, Q2H PRN **OR** glycopyrrolate (ROBINUL) injection 0.4 mg, 0.4 mg, Intravenous, Q2H PRN **OR** glycopyrrolate (ROBINUL) injection 0.4 mg, 0.4 mg, Subcutaneous, Q2H PRN, Manolo Barrera MD  •  haloperidol (HALDOL) tablet 1 mg, 1 mg, Oral, Q4H PRN **OR** haloperidol (HALDOL) 2 MG/ML solution 1 mg, 1 mg, Oral, Q4H PRN **OR** haloperidol lactate (HALDOL) injection 1 mg, 1 mg, Subcutaneous, Q4H PRN, Manolo Barrera MD  •  HYDROmorphone (DILAUDID) injection 0.5 mg, 0.5 mg, Intravenous, Q1H PRN, 0.5 mg at 05/13/23 0105 **OR** morphine injection 2 mg, 2 mg, Intravenous, Q1H PRN, 2 mg at 05/13/23 0527 **OR** morphine concentrated solution 5 mg, 5 mg, Sublingual, Q1H PRN **OR** ketorolac (TORADOL) injection 15 mg, 15 mg, Intravenous, Q6H PRN, Manolo Barrera MD  •  HYDROmorphone (DILAUDID) injection 1 mg, 1 mg, Intravenous, Q1H PRN, 1 mg at 05/13/23 1612 **OR** morphine injection 4 mg, 4 mg, Intravenous, Q1H PRN, 4 mg at 05/13/23 1218 **OR** morphine concentrated solution 10 mg, 10 mg, Sublingual, Q1H PRN, Manolo Barrera MD  •  LORazepam (ATIVAN) injection 0.5 mg, 0.5 mg, Intravenous, Q1H PRN, 0.5 mg at 05/13/23 1218 **OR** LORazepam (ATIVAN) injection 0.5 mg, 0.5 mg, Subcutaneous, Q1H PRN **OR** LORazepam (ATIVAN) 2 MG/ML concentrated solution 0.5 mg, 0.5 mg, Sublingual, Q1H PRN, Manolo Barrera MD  •  LORazepam (ATIVAN) injection 1 mg, 1 mg, Intravenous, Q1H PRN, 1 mg at 05/13/23 1612 **OR** LORazepam (ATIVAN) injection 1 mg, 1 mg, Subcutaneous, Q1H PRN **OR** LORazepam (ATIVAN) 2 MG/ML concentrated solution 1 mg, 1 mg, Sublingual, Q1H PRN, Manolo Barrera MD  •  LORazepam (ATIVAN) injection 2 mg, 2 mg, Intravenous, Q1H PRN **OR** LORazepam  (ATIVAN) injection 2 mg, 2 mg, Subcutaneous, Q1H PRN **OR** LORazepam (ATIVAN) 2 MG/ML concentrated solution 2 mg, 2 mg, Sublingual, Q1H PRN, Antwan Barrera MD  •  promethazine (PHENERGAN) tablet 6.25 mg, 6.25 mg, Oral, Q4H PRN **OR** promethazine (PHENERGAN) 6.25 MG/5ML syrup 6.25 mg, 6.25 mg, Oral, Q4H PRN **OR** promethazine (PHENERGAN) suppository 6.25 mg, 6.25 mg, Rectal, Q4H PRN, Antwan Barrera MD     ASSESSMENT  Severe dementia with behavioral disturbance  Left lower extremity cellulitis with sepsis  Acute kidney injury  Hypernatremia  Diabetes mellitus   Hypertension  Hyperlipidemia  Neuropathy    PLAN  Comfort care only  Allow natural death  Routine palliative care orders  Discussed with family  Discussed with nursing staff    ANTWAN BARRERA MD    Copied text in this note has been reviewed and is accurate as of 05/13/23

## 2023-05-14 NOTE — PROGRESS NOTES
"Daily progress note    Primary care physician  Dr. Walker    Chief complaint  Comfortable with no new issues and family at bedside    History of present illness  89-year-old white female with history of diabetes hypertension hyperlipidemia and neuropathy brought to the emergency room by the family with left foot swelling redness pain going on for last few days to week with no improvement with oral antibiotics.  Patient denies any fever chills chest pain shortness of breath abdominal pain nausea vomiting diarrhea.  Patient work-up in ER revealed acute kidney injury and also cellulitis with sepsis admit for management.  Patient is DNR per her wishes.      REVIEW OF SYSTEMS  Unable to obtain    PHYSICAL EXAM  Blood pressure 113/56, pulse 111, temperature 98.7 °F (37.1 °C), temperature source Oral, resp. rate 14, height 165.1 cm (65\"), weight 58.1 kg (128 lb), SpO2 90 %.    Unchanged     LAB RESULTS  Lab Results (last 24 hours)     Procedure Component Value Units Date/Time    Blood Culture - Blood, Arm, Right [[id number]]  (Normal) Collected: 05/09/23 1706    Specimen: Blood from Arm, Right Updated: 05/13/23 1717     Blood Culture No growth at 4 days    Blood Culture - Blood, Arm, Left [[id number]]  (Normal) Collected: 05/09/23 1646    Specimen: Blood from Arm, Left Updated: 05/13/23 1702     Blood Culture No growth at 4 days        Imaging Results (Last 24 Hours)     ** No results found for the last 24 hours. **          Current Facility-Administered Medications:   •  acetaminophen (TYLENOL) tablet 650 mg, 650 mg, Oral, Q4H PRN **OR** acetaminophen (TYLENOL) 160 MG/5ML solution 650 mg, 650 mg, Oral, Q4H PRN **OR** acetaminophen (TYLENOL) suppository 650 mg, 650 mg, Rectal, Q4H PRN, Manolo Barrera MD  •  diphenoxylate-atropine (LOMOTIL) 2.5-0.025 MG per tablet 1 tablet, 1 tablet, Oral, Q2H PRN, Manolo Barrera MD  •  First Mouthwash (Magic Mouthwash) 5 mL, 5 mL, Swish & Spit, Q4H PRN, Manolo Barrera MD  •  " Glycerin-Hypromellose- (ARTIFICIAL TEARS) 0.2-0.2-1 % ophthalmic solution solution 1 drop, 1 drop, Both Eyes, Q30 Min PRN, Manolo Barrrea MD  •  glycopyrrolate (ROBINUL) injection 0.2 mg, 0.2 mg, Intravenous, Q2H PRN **OR** glycopyrrolate (ROBINUL) injection 0.2 mg, 0.2 mg, Subcutaneous, Q2H PRN **OR** glycopyrrolate (ROBINUL) injection 0.4 mg, 0.4 mg, Intravenous, Q2H PRN **OR** glycopyrrolate (ROBINUL) injection 0.4 mg, 0.4 mg, Subcutaneous, Q2H PRN, Manolo Barrera MD  •  haloperidol (HALDOL) tablet 1 mg, 1 mg, Oral, Q4H PRN **OR** haloperidol (HALDOL) 2 MG/ML solution 1 mg, 1 mg, Oral, Q4H PRN **OR** haloperidol lactate (HALDOL) injection 1 mg, 1 mg, Subcutaneous, Q4H PRN, Manolo Barrera MD  •  HYDROmorphone (DILAUDID) injection 0.5 mg, 0.5 mg, Intravenous, Q1H PRN, 0.5 mg at 05/13/23 0105 **OR** morphine injection 2 mg, 2 mg, Intravenous, Q1H PRN, 2 mg at 05/13/23 0527 **OR** morphine concentrated solution 5 mg, 5 mg, Sublingual, Q1H PRN **OR** ketorolac (TORADOL) injection 15 mg, 15 mg, Intravenous, Q6H PRN, Manolo Barrera MD  •  HYDROmorphone (DILAUDID) injection 1 mg, 1 mg, Intravenous, Q1H PRN, 1 mg at 05/14/23 1213 **OR** morphine injection 4 mg, 4 mg, Intravenous, Q1H PRN, 4 mg at 05/13/23 1218 **OR** morphine concentrated solution 10 mg, 10 mg, Sublingual, Q1H PRN, Manolo Barrera MD  •  LORazepam (ATIVAN) injection 0.5 mg, 0.5 mg, Intravenous, Q1H PRN, 0.5 mg at 05/13/23 1218 **OR** LORazepam (ATIVAN) injection 0.5 mg, 0.5 mg, Subcutaneous, Q1H PRN **OR** LORazepam (ATIVAN) 2 MG/ML concentrated solution 0.5 mg, 0.5 mg, Sublingual, Q1H PRN, Manolo Barrera MD  •  LORazepam (ATIVAN) injection 1 mg, 1 mg, Intravenous, Q1H PRN, 1 mg at 05/14/23 0523 **OR** LORazepam (ATIVAN) injection 1 mg, 1 mg, Subcutaneous, Q1H PRN **OR** LORazepam (ATIVAN) 2 MG/ML concentrated solution 1 mg, 1 mg, Sublingual, Q1H PRN, Manolo Barrera MD  •  LORazepam (ATIVAN) injection 2 mg, 2 mg, Intravenous, Q1H PRN, 2 mg at 05/14/23 1213  **OR** LORazepam (ATIVAN) injection 2 mg, 2 mg, Subcutaneous, Q1H PRN **OR** LORazepam (ATIVAN) 2 MG/ML concentrated solution 2 mg, 2 mg, Sublingual, Q1H PRN, Antwan Barrera MD  •  promethazine (PHENERGAN) tablet 6.25 mg, 6.25 mg, Oral, Q4H PRN **OR** promethazine (PHENERGAN) 6.25 MG/5ML syrup 6.25 mg, 6.25 mg, Oral, Q4H PRN **OR** promethazine (PHENERGAN) suppository 6.25 mg, 6.25 mg, Rectal, Q4H PRN, Antwan Barrera MD     ASSESSMENT  Severe dementia with behavioral disturbance  Left lower extremity cellulitis with sepsis  Acute kidney injury  Hypernatremia  Diabetes mellitus   Hypertension  Hyperlipidemia  Neuropathy    PLAN  Comfort care only  Allow natural death  Routine palliative care orders  Discussed with family  Discussed with nursing staff    ANTWAN BARRERA MD    Copied text in this note has been reviewed and is accurate as of 05/14/23

## 2023-05-14 NOTE — PLAN OF CARE
Goal Outcome Evaluation:  Plan of Care Reviewed With: daughter        Progress: declining  Outcome Evaluation: Premed with Dilaudid 1mg and Ativan 2mg. Has been more comfortable with turns today. Dsng changed to LLE. F/C.  Daughter updated at bedside.

## 2023-05-14 NOTE — PLAN OF CARE
Goal Outcome Evaluation:              Outcome Evaluation: No changes. Premedicated for turns. Comfort care continued.

## 2023-05-15 NOTE — DISCHARGE SUMMARY
Discharge summary    Date of admission 2023  Date of death 5/15/2023    Discussion  89 years old white female with history of diabetes hypertension hyperlipidemia and severe neuropathy admitted to emergency room with left foot pain swelling and redness.  Patient work-up revealed acute kidney injury and also found to have cellulitis with sepsis admitted for management.  Patient treated with IV fluid IV antibiotics followed by nephrology infectious disease with very minimal improvement and remained delirious.  Patient further evaluated by palliative care service and family agree to pursue comfort care at palliative care unit and transfer to the unit where she remained comfortable and  peacefully.  Patient was DNR and her CODE STATUS changed to allow natural death.  Patient body released with family.    Cause of death cardiopulmonary failure    ANTWAN ARIAS MD

## 2023-05-15 NOTE — SIGNIFICANT NOTE
Patient wearing necklace and 2 rings. Daughter Theresa Rosen notified. States she does not want the jewelry removed from the patient. Says to keep it with the patient.

## 2023-05-15 NOTE — CASE MANAGEMENT/SOCIAL WORK
Continued Stay Note  [facility]     Patient Name: Dayanna Ayala  MRN: [id number]  Today's Date: 5/15/2023    Admit Date: 2023    Plan: Home with [facility] vs. SNF   Discharge Plan     Plan     Row Name 05/15/23 1714    Final Discharge Disposition Code 20 -     Final Note Patient  today on 5/15/2023                     Discharge Codes    No documentation.               Expected Discharge Date and Time     Expected Discharge Date Expected Discharge Time    May 15, 2023             Shannon Epley, RN

## 2023-05-16 NOTE — PROGRESS NOTES
Enter Query Response Below      Query Response:       Unable to determine Electronically signed by Manolo Barrera MD, 23, 11:08 AM EDT.         If applicable, please update the problem list.     Patient: Dyaanna Ayala        : 10/10/1921  Account: [id number]           Admit Date: 2023        How to Respond to this query:       a. Click New Note     b. Answer query within the yellow box.                c. Update the Problem List, if applicable.      If you have any questions about this query contact me at: [email or web address]        89 year old woman with history of DM2, HTN, and HLD, admitted  with Sepsis, cellulitis, and FLAKO.  Creatinine this admission was 2.34 on admit, down to 0.96 on . Treatment included monitoring, 500cc NS bolus, NS @ 75cc/hr.     Please document if the FLAKO can be further specified as:  - Due to or related to Sepsis and Cellulitis  - Unrelated to Sepsis and Cellulitis  - Other, please specify  - Unable to determine     By submitting this query, we are merely seeking further clarification of documentation to accurately reflect all conditions that you are monitoring, evaluating, treating or that extend the hospitalization or utilize additional resources of care. Please utilize your independent clinical judgment when addressing the question(s) above.     This query and your response, once completed, will be entered into the legal medical record.    Sincerely,   Rosa Elena Ferguson RN, BSN  [email or web address]  Clinical Documentation Integrity Program

## 2023-05-16 NOTE — PROGRESS NOTES
"Enter Query Response Below      Query Response:         Severe malnutrition Electronically signed by Manolo Barrera MD, 23, 11:09 AM EDT.       If applicable, please update the problem list.     Patient: Dayanna Ayala        : 10/10/1921  Account: [id number]           Admit Date: 2023        How to Respond to this query:       a. Click New Note     b. Answer query within the yellow box.                c. Update the Problem List, if applicable.      If you have any questions about this query contact me at: [email or web address]        89 year old woman with history of DM2, HTN, and HLD, admitted  with Sepsis, cellulitis, and FLAKO.  Registered dietitian saw patient  and notes: \"Patient meets ASPEN/AND criteria for nutrition diagnosis of severe malnutrition of chronic illness based on: insufficient energy intake, weight loss of 20 lb (14%) within 5 months and muscle wasting.\"  Dietitian assessment notes severe insufficient energy intake (<75% of est. energy requirement for > or equal to 1 month), severe unintentional weight loss, and severe loss of muscle mass.  Treatment included monitoring, transitioned to palliative care.     Please document if the patient was being treated/monitored for:  - Severe malnutrition  - Other, please specify  - Unable to determine     By submitting this query, we are merely seeking further clarification of documentation to accurately reflect all conditions that you are monitoring, evaluating, treating or that extend the hospitalization or utilize additional resources of care. Please utilize your independent clinical judgment when addressing the question(s) above.     This query and your response, once completed, will be entered into the legal medical record.    Sincerely,   Rosa Elena Ferguson RN, BSN  [email or web address]  Clinical Documentation Integrity Program  "